# Patient Record
Sex: FEMALE | Race: WHITE | URBAN - METROPOLITAN AREA
[De-identification: names, ages, dates, MRNs, and addresses within clinical notes are randomized per-mention and may not be internally consistent; named-entity substitution may affect disease eponyms.]

---

## 2018-08-05 ENCOUNTER — OFFICE VISIT (OUTPATIENT)
Dept: URGENT CARE | Facility: URGENT CARE | Age: 28
End: 2018-08-05
Payer: COMMERCIAL

## 2018-08-05 ENCOUNTER — RADIANT APPOINTMENT (OUTPATIENT)
Dept: GENERAL RADIOLOGY | Facility: CLINIC | Age: 28
End: 2018-08-05
Attending: FAMILY MEDICINE
Payer: COMMERCIAL

## 2018-08-05 VITALS
HEART RATE: 77 BPM | DIASTOLIC BLOOD PRESSURE: 85 MMHG | TEMPERATURE: 97.5 F | WEIGHT: 192.31 LBS | OXYGEN SATURATION: 98 % | SYSTOLIC BLOOD PRESSURE: 129 MMHG

## 2018-08-05 DIAGNOSIS — R11.0 NAUSEA: ICD-10-CM

## 2018-08-05 DIAGNOSIS — Z72.0 TOBACCO ABUSE: ICD-10-CM

## 2018-08-05 DIAGNOSIS — R04.2 BLOOD-TINGED SPUTUM: ICD-10-CM

## 2018-08-05 DIAGNOSIS — R05.8 PRODUCTIVE COUGH: Primary | ICD-10-CM

## 2018-08-05 DIAGNOSIS — R07.0 THROAT PAIN: ICD-10-CM

## 2018-08-05 PROCEDURE — 71046 X-RAY EXAM CHEST 2 VIEWS: CPT | Mod: FY

## 2018-08-05 PROCEDURE — 99214 OFFICE O/P EST MOD 30 MIN: CPT | Performed by: FAMILY MEDICINE

## 2018-08-05 RX ORDER — ESTRADIOL 2 MG/1
TABLET ORAL
COMMUNITY
Start: 2018-07-03

## 2018-08-05 RX ORDER — AZITHROMYCIN 250 MG/1
TABLET, FILM COATED ORAL
Qty: 6 TABLET | Refills: 0 | Status: SHIPPED | OUTPATIENT
Start: 2018-08-05 | End: 2018-08-10

## 2018-08-05 RX ORDER — SPIRONOLACTONE 25 MG/1
50 TABLET ORAL
COMMUNITY
Start: 2018-07-03

## 2018-08-05 NOTE — PROGRESS NOTES
SUBJECTIVE: Maria De Jesus Garcia is a 28 year old female presenting with a chief complaint of nasal congestion, cough  and sore throat.  Onset of symptoms was 10 day(s) ago.  Course of illness is worsening.    Severity moderate  Current and Associated symptoms: runny nose, stuffy nose, cough - productive and facial pain/pressure  Treatment measures tried include None tried.  Predisposing factors include tobacco use.    No past medical history on file.  Allergies   Allergen Reactions     Cats      Vegetable Extract      Social History   Substance Use Topics     Smoking status: Former Smoker     Types: Cigarettes     Smokeless tobacco: Never Used     Alcohol use Not on file       ROS:  SKIN: no rash  GI: no vomiting    OBJECTIVE:  /85  Pulse 77  Temp 97.5  F (36.4  C) (Oral)  Wt 192 lb 5 oz (87.2 kg)  SpO2 98%GENERAL APPEARANCE: healthy, alert and no distress  EYES: EOMI,  PERRL, conjunctiva clear  HENT: ear canals and TM's normal.  Nose and mouth without ulcers, erythema or lesions  NECK: supple, nontender, no lymphadenopathy  RESP: lungs clear to auscultation - no rales, rhonchi or wheezes  CV: regular rates and rhythm, normal S1 S2, no murmur noted  ABDOMEN:  soft, nontender, no HSM or masses and bowel sounds normal  SKIN: no suspicious lesions or rashes      ICD-10-CM    1. Productive cough R05 XR Chest 2 Views     azithromycin (ZITHROMAX) 250 MG tablet   2. Blood-tinged sputum R04.2 XR Chest 2 Views     azithromycin (ZITHROMAX) 250 MG tablet   3. Throat pain R07.0 CANCELED: Strep, Rapid Screen   4. Nausea R11.0 XR Chest 2 Views   5. Tobacco abuse Z72.0 XR Chest 2 Views       Fluids/Rest, f/u if worse/not any better

## 2018-08-05 NOTE — MR AVS SNAPSHOT
After Visit Summary   8/5/2018    Maria De Jesus Garcia    MRN: [id number]           Patient Information     Date Of Birth          1990        Visit Information        Provider Department      8/5/2018 3:30 PM Cory Carrion,  [facility]        Today's Diagnoses     Productive cough    -  1    Blood-tinged sputum        Throat pain        Nausea        Tobacco abuse           Follow-ups after your visit        Who to contact     If you have questions or need follow up information about today's clinic visit or your schedule please contact [facility] directly at [phone].  Normal or non-critical lab and imaging results will be communicated to you by MyChart, letter or phone within 4 business days after the clinic has received the results. If you do not hear from us within 7 days, please contact the clinic through MyChart or phone. If you have a critical or abnormal lab result, we will notify you by phone as soon as possible.  Submit refill requests through Fonmatch or call your pharmacy and they will forward the refill request to us. Please allow 3 business days for your refill to be completed.          Additional Information About Your Visit        Care EveryWhere ID     This is your Care EveryWhere ID. This could be used by other organizations to access your [place] medical records  [id number]        Your Vitals Were     Pulse Temperature Pulse Oximetry             77 97.5  F (36.4  C) (Oral) 98%          Blood Pressure from Last 3 Encounters:   08/05/18 129/85    Weight from Last 3 Encounters:   08/05/18 192 lb 5 oz (87.2 kg)              We Performed the Following     XR Chest 2 Views          Today's Medication Changes          These changes are accurate as of 8/5/18  4:39 PM.  If you have any questions, ask your nurse or doctor.               Start taking these medicines.        Dose/Directions    azithromycin 250 MG tablet   Commonly  known as:  ZITHROMAX   Used for:  Productive cough, Blood-tinged sputum   Started by:  Cory Carrion, DO        Two tablets first day, then one tablet daily for four days.   Quantity:  6 tablet   Refills:  0            Where to get your medicines      These medications were sent to [facility] [address].  [address], [facility] [address]     Phone:  [phone]     azithromycin 250 MG tablet                Primary Care Provider Fax #    Physician No Ref-Primary [phone]       No address on file        Equal Access to Services     [facility]: Hadii martin leo hadasho Soomaali, waaxda luqadaha, qaybta kaalmada adeegyada, bhargavi marin . So [facility] [phone].    ATENCIÓN: Si habla español, tiene a enrique disposición servicios gratuitos de asistencia lingüística. Jodee al [phone].    We comply with applicable federal civil rights laws and [place] laws. We do not discriminate on the basis of race, color, national origin, age, disability, sex, sexual orientation, or gender identity.            Thank you!     Thank you for choosing [facility]  for your care. Our goal is always to provide you with excellent care. Hearing back from our patients is one way we can continue to improve our services. Please take a few minutes to complete the written survey that you may receive in the mail after your visit with us. Thank you!             Your Updated Medication List - Protect others around you: Learn how to safely use, store and throw away your medicines at www.disposemymeds.org.          This list is accurate as of 8/5/18  4:39 PM.  Always use your most recent med list.                   Brand Name Dispense Instructions for use Diagnosis    azithromycin 250 MG tablet    ZITHROMAX    6 tablet    Two tablets first day, then one tablet daily for four days.    Productive cough, Blood-tinged sputum       DAYQUIL OR       Take by mouth as needed        estradiol 2 MG tablet    ESTRACE     TAKE TWO TABLETS BY MOUTH EVERY MORNING AND TAKE ONE TABLET BY MOUTH EVERY EVENING        HEADACHE RELIEF PO      Take by mouth as needed        spironolactone 25 MG tablet    ALDACTONE     Take 50 mg by mouth

## 2019-01-25 ENCOUNTER — OFFICE VISIT (OUTPATIENT)
Dept: URGENT CARE | Facility: URGENT CARE | Age: 29
End: 2019-01-25
Payer: COMMERCIAL

## 2019-01-25 VITALS
WEIGHT: 209.2 LBS | SYSTOLIC BLOOD PRESSURE: 110 MMHG | RESPIRATION RATE: 12 BRPM | TEMPERATURE: 97.8 F | HEART RATE: 86 BPM | OXYGEN SATURATION: 97 % | DIASTOLIC BLOOD PRESSURE: 80 MMHG

## 2019-01-25 DIAGNOSIS — Z20.818 EXPOSURE TO STREP THROAT: ICD-10-CM

## 2019-01-25 DIAGNOSIS — R52 BODY ACHES: ICD-10-CM

## 2019-01-25 DIAGNOSIS — J02.9 SORETHROAT: Primary | ICD-10-CM

## 2019-01-25 LAB
DEPRECATED S PYO AG THROAT QL EIA: NORMAL
SPECIMEN SOURCE: NORMAL

## 2019-01-25 PROCEDURE — 87880 STREP A ASSAY W/OPTIC: CPT | Performed by: FAMILY MEDICINE

## 2019-01-25 PROCEDURE — 99213 OFFICE O/P EST LOW 20 MIN: CPT | Performed by: FAMILY MEDICINE

## 2019-01-25 PROCEDURE — 87081 CULTURE SCREEN ONLY: CPT | Performed by: FAMILY MEDICINE

## 2019-01-25 NOTE — LETTER
[facility]  [address] [address]  Phone: [phone]    01/25/19    Maria De Jesus Garcia  [address] [place] [id number]      To whom it may concern:     Maria De Jesus Garcia was seen in the clinic for current illness, she missed work today for her illness.      Sincerely,      Surekha Sal MD

## 2019-01-25 NOTE — PROGRESS NOTES
Chief Complaint   Patient presents with     Throat Pain     Pt states throat pain, body aches, chills      Urgent Care         SUBJECTIVE:   Maria De Jesus Garcia is a 28 year old female presenting with a chief complaint of cough - productive, sore throat and body aches. She is a new patient of [facility].  Onset of symptoms was 3 day(s) ago.  Course of illness is worsening.    Severity moderate  Current and Associated symptoms: cough - productive and sore throat  Treatment measures tried include Tylenol/Ibuprofen.  Predisposing factors include exposure to strep.    History reviewed. No pertinent past medical history.  Current Outpatient Medications   Medication Sig Dispense Refill     Aspirin-Acetaminophen-Caffeine (HEADACHE RELIEF PO) Take by mouth as needed       DM-Phenylephrine-Acetaminophen (COLD/FLU RELIEF PO)        estradiol (ESTRACE) 2 MG tablet TAKE TWO TABLETS BY MOUTH EVERY MORNING AND TAKE ONE TABLET BY MOUTH EVERY EVENING       spironolactone (ALDACTONE) 25 MG tablet Take 50 mg by mouth       Pseudoephedrine-APAP-DM (DAYQUIL OR) Take by mouth as needed       Social History     Tobacco Use     Smoking status: Former Smoker     Types: Cigarettes     Smokeless tobacco: Never Used   Substance Use Topics     Alcohol use: Not on file     Family History   Problem Relation Age of Onset     Cancer Maternal Grandfather          ROS:    10 point ROS of systems including Constitutional, Eyes Cardiovascular, Gastroenterology, Genitourinary, Integumentary, Muscularskeletal, Psychiatric were all negative except for pertinent positives noted in my HPI     [id number]}    OBJECTIVE:  /80   Pulse 86   Temp 97.8  F (36.6  C) (Oral)   Resp 12   Wt 94.9 kg (209 lb 3.2 oz)   SpO2 97%   GENERAL APPEARANCE: healthy, alert and no distress  EYES: EOMI,  PERRL, conjunctiva clear  HENT: ear canals and TM's normal.  Nose and mouth without ulcers, erythema or lesions  NECK: supple, nontender, no lymphadenopathy  RESP: lungs clear to  auscultation - no rales, rhonchi or wheezes  CV: regular rates and rhythm, normal S1 S2, no murmur noted  ABDOMEN:  soft, nontender, no HSM or masses and bowel sounds normal  SKIN: no suspicious lesions or rashes  Physical Exam      X-Ray was not done.      ASSESSMENT:  Maria De Jesus was seen today for throat pain and urgent care.    Diagnoses and all orders for this visit:    Sorethroat  -     Strep, Rapid Screen  -     Beta strep group A culture    Body aches    Exposure to strep throat          PLAN:  Tylenol, Ibuprofen, Fluids, Rest, Saline gargles, Saline nasal spray and Vaporizer  Notified patient about the negative strep result  Advised to continue doing symptomatic management  If notices any worsening symptoms should follow-up  Also was given a note for her work excusing her from work today as she missed her work due to her illness.  Pt understood and agreed with plan    Surekha Sal MD       See orders in Epic

## 2019-01-26 LAB
BACTERIA SPEC CULT: NORMAL
SPECIMEN SOURCE: NORMAL

## 2019-02-08 ENCOUNTER — ANCILLARY PROCEDURE (OUTPATIENT)
Dept: GENERAL RADIOLOGY | Facility: CLINIC | Age: 29
End: 2019-02-08
Attending: FAMILY MEDICINE
Payer: COMMERCIAL

## 2019-02-08 ENCOUNTER — OFFICE VISIT (OUTPATIENT)
Dept: URGENT CARE | Facility: URGENT CARE | Age: 29
End: 2019-02-08
Payer: COMMERCIAL

## 2019-02-08 VITALS
TEMPERATURE: 98.2 F | RESPIRATION RATE: 16 BRPM | SYSTOLIC BLOOD PRESSURE: 118 MMHG | OXYGEN SATURATION: 98 % | HEART RATE: 109 BPM | DIASTOLIC BLOOD PRESSURE: 78 MMHG

## 2019-02-08 DIAGNOSIS — R04.2 BLOOD-STREAKED SPUTUM: Primary | ICD-10-CM

## 2019-02-08 DIAGNOSIS — J20.9 ACUTE BRONCHITIS WITH COEXISTING CONDITION REQUIRING PROPHYLACTIC TREATMENT: ICD-10-CM

## 2019-02-08 PROCEDURE — 99214 OFFICE O/P EST MOD 30 MIN: CPT | Performed by: FAMILY MEDICINE

## 2019-02-08 PROCEDURE — 71046 X-RAY EXAM CHEST 2 VIEWS: CPT

## 2019-02-08 RX ORDER — CODEINE PHOSPHATE AND GUAIFENESIN 10; 100 MG/5ML; MG/5ML
1-2 SOLUTION ORAL EVERY 4 HOURS PRN
Qty: 118 ML | Refills: 0 | Status: SHIPPED | OUTPATIENT
Start: 2019-02-08 | End: 2019-08-07

## 2019-02-08 RX ORDER — AZITHROMYCIN 250 MG/1
TABLET, FILM COATED ORAL
Qty: 6 TABLET | Refills: 0 | Status: SHIPPED | OUTPATIENT
Start: 2019-02-08 | End: 2019-02-13

## 2019-02-08 NOTE — LETTER
February 8, 2019      Maria De Jesus Garcia  [address] [place] [id number]        To Whom It May Concern:    Maria De Jesus Garcia  was seen on 2/8/2019.  Please excuse her due to illness.        Sincerely,        Surekha Sal MD

## 2019-02-08 NOTE — PROGRESS NOTES
SUBJECTIVE:   Maria De Jesus Garcia is a 28 year old female presenting with a chief complaint of congestion with productive coughing for almost last 1-1/2 weeks but then for a day she has noticed some blood-streaked sputum denies any fresh blood but did notice some brown colored streaks in her phlegm.  Patient denies any shortness of breath fever chills or chest pain symptoms.  She has been trying over-the-counter medication with not much improvement in his symptoms stuffy nose and cough - productive. She is an established patient of [facility].  Onset of symptoms was 13 day(s) ago.  Course of illness is worsening.    Severity moderate  Current and Associated symptoms: stuffy nose, cough - productive and headache  Treatment measures tried include OTC Cough med.  Predisposing factors include None.    History reviewed. No pertinent past medical history.  Current Outpatient Medications   Medication Sig Dispense Refill     estradiol (ESTRACE) 2 MG tablet TAKE TWO TABLETS BY MOUTH EVERY MORNING AND TAKE ONE TABLET BY MOUTH EVERY EVENING       spironolactone (ALDACTONE) 25 MG tablet Take 50 mg by mouth       Aspirin-Acetaminophen-Caffeine (HEADACHE RELIEF PO) Take by mouth as needed       DM-Phenylephrine-Acetaminophen (COLD/FLU RELIEF PO)        Pseudoephedrine-APAP-DM (DAYQUIL OR) Take by mouth as needed       Social History     Tobacco Use     Smoking status: Former Smoker     Types: Cigarettes     Smokeless tobacco: Never Used   Substance Use Topics     Alcohol use: Not on file     Family History   Problem Relation Age of Onset     Cancer Maternal Grandfather          ROS:    10 point ROS of systems including Constitutional, Eyes,Cardiovascular, Gastroenterology, Genitourinary, Integumentary, Muscularskeletal, Psychiatric were all negative except for pertinent positives noted in my HPI         OBJECTIVE:  /78   Pulse 109   Temp 98.2  F (36.8  C) (Oral)   Resp 16   SpO2 98%   GENERAL APPEARANCE: healthy, alert and no  distress  EYES: EOMI,  PERRL, conjunctiva clear  HENT: ear canals and TM's normal.  Nose and mouth without ulcers, erythema or lesions  NECK: supple, nontender, no lymphadenopathy  RESP: lungs clear to auscultation - no rales, rhonchi or wheezes  CV: regular rates and rhythm, normal S1 S2, no murmur noted  ABDOMEN:  soft, nontender, no HSM or masses and bowel sounds normal  SKIN: no suspicious lesions or rashes  Physical Exam      X-Ray was done, my findings are: WNL    Medical Decision Making:    Differential Diagnosis:  URI Adult/Peds:  Bronchiolitis, Pneumonia and Sinusitis      ASSESSMENT:  Maria De Jesus was seen today for urgent care.    Diagnoses and all orders for this visit:    Blood-streaked sputum  -     XR Chest 2 Views    Acute bronchitis with coexisting condition requiring prophylactic treatment  -     XR Chest 2 Views  -     azithromycin (ZITHROMAX) 250 MG tablet; Take 2 tablets (500 mg) by mouth daily for 1 day, THEN 1 tablet (250 mg) daily for 4 days.  -     guaiFENesin-codeine (ROBITUSSIN AC) 100-10 MG/5ML solution; Take 5-10 mLs by mouth every 4 hours as needed for cough          PLAN:  Tylenol, Ibuprofen, Fluids, Rest, Saline gargles, Saline nasal spray and Vaporizer  Advised patient to start the antibiotic take it as directed  Reviewed the x-ray results there was no acute infiltrate noted  Discussed with patient not to drive when taking the cough medication if notices any worsening chest pain  Shortness of breath fever chills should follow-up   With the worsening of the symptoms patient is possibly developing a pneumonia and would treat her with an antibiotic though x-ray does not show any findings today.  Patient was also given a note for her work.too      Surekha Sal MD     See orders in Epic

## 2020-03-20 ENCOUNTER — NURSE TRIAGE (OUTPATIENT)
Dept: NURSING | Facility: CLINIC | Age: 30
End: 2020-03-20

## 2020-03-20 DIAGNOSIS — Z79.2 PREVENTIVE ANTIBIOTIC: Primary | ICD-10-CM

## 2020-03-20 RX ORDER — AZITHROMYCIN 250 MG/1
TABLET, FILM COATED ORAL
Qty: 6 TABLET | Refills: 0 | Status: SHIPPED | OUTPATIENT
Start: 2020-03-20 | End: 2020-05-16

## 2020-03-20 NOTE — TELEPHONE ENCOUNTER
S: Calling  about blood tinged sputum.  B: 2/9/19 had a similar symptoms. Was placed on antibiotics.   Today has a productive cough started around 3/13. On 3/19 started having blood tinged sputum.  Has had the chills.  Does not believe she has a fever, no thermometer at home. Is SOB if climbs a flight of steps or walks 1 block.  A: Requesting to see a provider at [facility].  Does not have a PCP.  R: Writer  will be routing a message to the covid19 pool for a provider to call the patient back.  Mouna Hunter RN, [facility]    Additional Information    Negative: Bluish (or gray) lips or face    Negative: Severe difficulty breathing (e.g., struggling for each breath, speaks in single words)    Negative: Rapid onset of cough and has hives    Negative: Coughing started suddenly after medicine, an allergic food or bee sting    Negative: Difficulty breathing after exposure to flames, smoke, or fumes    Negative: Sounds like a life-threatening emergency to the triager    Negative: Previous asthma attacks and this feels like asthma attack    Negative: Chest pain present when not coughing    Negative: Difficulty breathing    Negative: Passed out (i.e., fainted, collapsed and was not responding)    Negative: Patient sounds very sick or weak to the triager    Coughed up > 1 tablespoon (15 ml) blood (Exception: blood-tinged sputum)    Protocols used: COUGH-A-OH

## 2020-03-20 NOTE — PROGRESS NOTES
I contacted the patient and educated them on the current guidelines for mild-moderate Covid-19 symptoms. Patient was advised to self quarantine for 7 days past the onset of symptoms (or 3 days after not having a fever). Patient will proceed to the ED if symptoms worsen and become critical.     Due to the patient's history of bronchitis, I would like to treat her with a z-jessenia. If z-jessenia does not help to improve symptoms, please proceed to the ED for further evaluation if symptoms continue to worsen.     Matt Shah PA-C on 3/20/2020 at 6:39 PM

## 2020-03-20 NOTE — LETTER
March 20, 2020      To Whom It May Concern:      Maria De Jesus Garcia was seen via a telephone encounter in our urgent care today, 03/20/20.  I expect her condition to improve over the next 7 days.  She may return to work when improved.    Sincerely,        Matt Shah PA-C

## 2020-04-01 ENCOUNTER — NURSE TRIAGE (OUTPATIENT)
Dept: NURSING | Facility: CLINIC | Age: 30
End: 2020-04-01

## 2020-04-01 ENCOUNTER — VIRTUAL VISIT (OUTPATIENT)
Dept: URGENT CARE | Facility: CLINIC | Age: 30
End: 2020-04-01
Payer: COMMERCIAL

## 2020-04-01 DIAGNOSIS — J20.9 ACUTE BRONCHITIS, UNSPECIFIED ORGANISM: ICD-10-CM

## 2020-04-01 DIAGNOSIS — Z20.822 SUSPECTED 2019 NOVEL CORONAVIRUS INFECTION: Primary | ICD-10-CM

## 2020-04-01 PROCEDURE — 99207 ZZC NO BILLABLE SERVICE THIS VISIT: CPT | Mod: TEL | Performed by: STUDENT IN AN ORGANIZED HEALTH CARE EDUCATION/TRAINING PROGRAM

## 2020-04-01 NOTE — LETTER
April 1, 2020      Maria De Jesus Garcia  [address]   [facility] [address]        To Whom It May Concern:    Maria De Jesus Garcia was seen in our clinic. She may return to work with the following on 4/8 if she meets all of the following criteria:    1. Absence of fever for 72 hours AND  2. Significant improvement in symptoms of cough and shortness of breath AND  3. Greater than 7 days since start of her symptoms.     Sincerely,        Zane Connors MD

## 2020-04-01 NOTE — PROGRESS NOTES
"The patient has been notified of following:     \"This telephone visit will be conducted via a call between you and your physician/provider. We have found that certain health care needs can be provided without the need for a physical exam.  This service lets us provide the care you need with a short phone conversation.  If a prescription is necessary we can send it directly to your pharmacy.  If lab work is needed we can place an order for that and you can then stop by our lab to have the test done at a later time.    If during the course of the call the physician/provider feels a telephone visit is not appropriate, you will not be charged for this service.\"     Subjective     CC: Maria De Jesus Garcia  is a 29 year old female who presents to clinic today for the following health issues:   Chief Complaint   Patient presents with     Suspected Covid      History:    Patient with subjective fever/chills and cough productive of blood tinged sputum, chest pain, light headedness on 3/19 with associated shortness of breath.  She was prescribed z-pack given her symptoms and history of bronchitis. Z-pac helped significantly but never completely went away but cough and shortness of breath improved over the weekend.      Symptoms worsened two days ago, now having blood tinged sputum with associated chest pain, shortness of breath, and light headedness with exertion. Having some mild wheezing.  Describes chest pain as a \"sharp rumbling/vibrating pain\" worse when she breathes hard or walks. This morning felt feverish, took dayquil and ibuprofen.  Does not have a thermometer.  Has mild nasal congestion, intermittent headaches, and sore throat. Denies sinus pain/pressure. Denies loss of sensation of taste/smell.  Has low appetite, but has been eating and drinking regularly, able to maintain hydration.      Patient reports history of lung infections that usually improved with a z-pac.  Reports history of chronic bronchitis.  Last cigarette " "was 6 months ago, prior to this smoked 1 cigarette every day-week.     Lives with stefanie and three roommates. Stefanie has cold like symptoms.  Denies any known close contact with COVID-19 patient.  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     No recent hospitalizations. No chance of pregnancy or breast feeding.     There is no problem list on file for this patient.    No past surgical history on file.    Social History     Tobacco Use     Smoking status: Former Smoker     Types: Cigarettes     Smokeless tobacco: Never Used   Substance Use Topics     Alcohol use: Not on file     Family History   Problem Relation Age of Onset     Cancer Maternal Grandfather          Current Outpatient Medications   Medication Sig Dispense Refill     Aspirin-Acetaminophen-Caffeine (HEADACHE RELIEF PO) Take by mouth as needed       DM-Phenylephrine-Acetaminophen (COLD/FLU RELIEF PO)        estradiol (ESTRACE) 2 MG tablet TAKE TWO TABLETS BY MOUTH EVERY MORNING AND TAKE ONE TABLET BY MOUTH EVERY EVENING       Pseudoephedrine-APAP-DM (DAYQUIL OR) Take by mouth as needed       spironolactone (ALDACTONE) 25 MG tablet Take 50 mg by mouth       Allergies   Allergen Reactions     Cats      Vegetable Extract      Review of Systems   ROS COMP: Constitutional, HEENT, cardiovascular, pulmonary, GI, , musculoskeletal, neuro, skin, endocrine and psych systems are negative, except as otherwise noted.      Objective    Gen: Patient is alert, oriented  Resp: is not speaking full sentences given frequent coughing fits, with cough,  without audible shortness of breath, without wheezing    Assessment/Plan:  1. Suspected 2019 Novel Coronavirus Infection  2. Acute bronchitis, unspecified organism  Patient reports 2 weeks of subjective fevers, cough with blood tinged sputum, shortness of " breath, pleuritic chest pain that improved with z-pac, now worsening, suspect bronchitis however unclear etiology, consider viral URI (such as coronavirus), viral pneumonia, bacterial pneumonia as well.  Given duration and trajectory of symptoms patient would benefit from in person urgent care for CXR to better delineate symptoms. Patient directed and given instructions on how to make appointment to urgent care, and instructions for self-management and isolation given symptoms could be due to coronavirus.   - get well loop referral placed     Phone call duration:  20 minutes  Start time: 9:10 am  End time: 9:30 am      Plan of care discussed with Dr. Ramos.    Maximus Connors MD PhD   Internal Medicine, PGY-3

## 2020-04-01 NOTE — PATIENT INSTRUCTIONS
Instructions for Patients  Based on the information you have provided, further evaluation in urgent care is indicated. [facility] call [facility] ([phone]) to schedule a urgent care appointment at one of our urgent care or walk in care sites. These are located in [facility], [place]), [place] and [place]. It is essential that you let the  know that you were referred to be seen in urgent care from [facility] or provider visit.         At this time we will NOT perform coronavirus testing in urgent care sites. This test is currently being reserved for patients who are being admitted to the hospital.    PLEASE BRING DOCUMENTATION FROM THIS COMPLETED VISIT TO YOUR URGENT CARE VISIT.    Please also bring your smart device(s) (smart phones, tablets, laptops) and their charging cables for your personal use and to communicate with your care team during your visit.        [facility] Information  We know it can be scary to hear that you might have COVID-19. Our team can help track your symptoms and make sure you are doing ok over the next two weeks using a program called [email or web address] to keep in touch. When you receive an email from [email or web address], please consider enrolling in our monitoring program. There is no cost to you for monitoring. Here is a URL where you can learn more: [email or web address]      Additional General Information About COVID-19    COVID-19 - General Information:  Regardless of if you have been tested or not:  Patient who have symptoms (cough, fever, or shortness of breath), need to isolate for 7 days from when symptoms started AND 72 hours after fever resolves (without fever reducing medications) AND improvement of respiratory symptoms (whichever is longer).      Isolate yourself at home (in own room/own bathroom if possible)    Do Not allow any visitors    Do Not go to work or school    Do Not go to  Confucianism,  centers, shopping, or other public places.    Do Not shake hands.    Avoid close and intimate contact with others (hugging, kissing).    Follow CDC recommendations for household cleaning of frequently touched services.     After the initial 7 days, continue to isolate yourself from household members as much as possible. To continue decrease the risk of community spread and exposure, you and any members of your household should limit activities in public for 14 days after starting home isolation.     You can reference the following CDC link for helpful home isolation/care tips:  https://www.cdc.gov/coronavirus/2019-ncov/downloads/10Things.pdf    COVID-19 - Symptoms:     The COVID-19 can cause a respiratory illness, such as bronchitis or pneumonia.    The most common symptoms are: cough, fever, and shortness of breath.     Other symptoms are: body aches, chills, diarrhea, fatigue, headache, runny nose, and sore throat     COVID-19 - Exposure Risk Factors:    Exposure to a person who has been diagnosed with COVID-19 .    Travel from an area with recent local transmission of COVID-19 .    The CDC (www.cdc.gov) has the most up-to-date list of where the COVID-19 outbreak is occurring.    COVID-19 - Spreading:     The virus likely spreads through respiratory droplets produced when a person coughs or sneezes. These respiratory droplets can travel approximately 6 feet and can remain on surfaces.  Common disinfectants will kill the virus.    The CDC currently does not recommend healthy people wearing masks.    COVID-19 - Protect Yourself:     Avoid close contact with people known to have this new COVID-19 infection.    Wash hands often with soap and water or alcohol-based hand .    Avoid touching the eyes, nose or mouth.       Thank you for limiting contact with others, wearing a simple mask to cover your cough, practice good hand hygiene habits and accessing our [email or web address] services where possible to  limit the spread of this virus.    For more information about COVID19 and options for caring for yourself at home, please visit the CDC website at https://www.cdc.gov/coronavirus/2019-ncov/about/steps-when-sick.html  For more options for care at [facility], please visit our website at [email or web address]

## 2020-04-01 NOTE — TELEPHONE ENCOUNTER
Maria De Jesus called a while ago and was prescribed a zpak and is helping.  maria de jesus has a history of lung infections.  Cough is present and wet which started 3 to 4 weeks ago.  Fever is present.  Shortness of breath is also present.  Started taking Zpak on 3/20/2020.  Today is wheezing.  Maria De Jesus is requesting a telephone visit.      Reason for Disposition    Wheezing is present    Additional Information    Negative: Severe difficulty breathing (e.g., struggling for each breath, speaks in single words)    Negative: Bluish (or gray) lips or face now    Negative: [1] Difficulty breathing AND [2] exposure to flames, smoke, or fumes    Negative: [1] Stridor AND [2] difficulty breathing    Negative: Sounds like a life-threatening emergency to the triager    Negative: Chest pain  (Exception: MILD central chest pain, present only when coughing)    Negative: Difficulty breathing    Negative: Patient sounds very sick or weak to the triager    Negative: [1] Coughed up blood AND [2] > 1 tablespoon (15 ml) (Exception: blood-tinged sputum)    Negative: Fever > 103 F (39.4 C)    Negative: [1] Fever > 101 F (38.3 C) AND [2] age > 60    Negative: [1] Fever > 100.0 F (37.8 C) AND [2] bedridden (e.g., nursing home patient, CVA, chronic illness, recovering from surgery)    Negative: [1] Fever > 100.0 F (37.8 C) AND [2] diabetes mellitus or weak immune system (e.g., HIV positive, cancer chemo, splenectomy, chronic steroids)    Protocols used: COUGH - ACUTE TDHIYABNAT-P-NK

## 2020-04-02 ENCOUNTER — OFFICE VISIT (OUTPATIENT)
Dept: URGENT CARE | Facility: URGENT CARE | Age: 30
End: 2020-04-02
Payer: COMMERCIAL

## 2020-04-02 VITALS
DIASTOLIC BLOOD PRESSURE: 85 MMHG | SYSTOLIC BLOOD PRESSURE: 110 MMHG | OXYGEN SATURATION: 98 % | TEMPERATURE: 98.2 F | RESPIRATION RATE: 12 BRPM | HEART RATE: 95 BPM

## 2020-04-02 DIAGNOSIS — R05.9 COUGH: Primary | ICD-10-CM

## 2020-04-02 PROCEDURE — 99214 OFFICE O/P EST MOD 30 MIN: CPT | Performed by: FAMILY MEDICINE

## 2020-04-02 RX ORDER — LEVOFLOXACIN 500 MG/1
500 TABLET, FILM COATED ORAL DAILY
Qty: 7 TABLET | Refills: 0 | Status: SHIPPED | OUTPATIENT
Start: 2020-04-02 | End: 2020-04-12

## 2020-04-02 RX ORDER — CODEINE PHOSPHATE/GUAIFENESIN 10-100MG/5
5 LIQUID (ML) ORAL EVERY 4 HOURS PRN
Qty: 240 ML | Refills: 0 | Status: SHIPPED | OUTPATIENT
Start: 2020-04-02

## 2020-04-02 NOTE — PROGRESS NOTES
I reviewed the telephone visit encounter and discussed the patient with the resident and agree with the resident s assessment and plan of care as documented.      Ethan Ramos MD  04/01/20 7:11 PM

## 2020-04-03 NOTE — PROGRESS NOTES
SUBJECTIVE:  Maria De Jesus Garcia is a 29 year old female who presents to the clinic today with a chief complaint of cough  for 4 week(s).  Her cough is described as persistent and productive blood tinged.    The patient's symptoms are moderate and stable.  Associated symptoms include fever. The patient's symptoms are exacerbated by no particular triggers  Patient has been using OTC cough suppressants  to improve symptoms and was on an antibiotic (finished zithromax one week ago)   Helped some but has come back    Hx of lung infections    Current Outpatient Medications   Medication Sig Dispense Refill     Aspirin-Acetaminophen-Caffeine (HEADACHE RELIEF PO) Take by mouth as needed       estradiol (ESTRACE) 2 MG tablet TAKE TWO TABLETS BY MOUTH EVERY MORNING AND TAKE ONE TABLET BY MOUTH EVERY EVENING       spironolactone (ALDACTONE) 25 MG tablet Take 50 mg by mouth       DM-Phenylephrine-Acetaminophen (COLD/FLU RELIEF PO)        Pseudoephedrine-APAP-DM (DAYQUIL OR) Take by mouth as needed         Social History     Tobacco Use     Smoking status: Former Smoker     Types: Cigarettes     Smokeless tobacco: Never Used   Substance Use Topics     Alcohol use: Not on file       ROS  INTEGUMENTARY/SKIN: NEGATIVE for worrisome rashes, moles or lesions  EYES: NEGATIVE for vision changes or irritation    OBJECTIVE:  /85 (BP Location: Left arm, Patient Position: Sitting, Cuff Size: Adult Regular)   Pulse 95   Temp 98.2  F (36.8  C)   Resp 12   SpO2 98%   GENERAL APPEARANCE: mild distress and over weight  EYES: EOMI,  PERRL, conjunctiva clear  HENT: ear canals and TM's normal.  Nose and mouth without ulcers, erythema or lesions  NECK: supple, nontender, no lymphadenopathy  RESP: lungs clear to auscultation - no rales, rhonchi or wheezes  CV: regular rates and rhythm, normal S1 S2, no murmur noted  NEURO: Normal strength and tone, sensory exam grossly normal,  normal speech and mentation  SKIN: no suspicious lesions or  rashes    ASSESSMENT:  1. Cough  Broaden coverage    sats normal and exam as well minus her appearance    Symptomatic cares were discussed in detail.  Pt instructed to come back to the clinic for worsening sx especially given current coronavirus issue at hand  - levofloxacin (LEVAQUIN) 500 MG tablet; Take 1 tablet (500 mg) by mouth daily  Dispense: 7 tablet; Refill: 0  - guaiFENesin-codeine (GUAIFENESIN AC) 100-10 MG/5ML syrup; Take 5 mLs by mouth every 4 hours as needed for congestion  Dispense: 240 mL; Refill: 0

## 2020-04-08 ENCOUNTER — TELEPHONE (OUTPATIENT)
Dept: URGENT CARE | Facility: URGENT CARE | Age: 30
End: 2020-04-08

## 2020-04-12 ENCOUNTER — HOSPITAL ENCOUNTER (EMERGENCY)
Facility: CLINIC | Age: 30
Discharge: HOME | End: 2020-04-12
Attending: EMERGENCY MEDICINE | Admitting: EMERGENCY MEDICINE
Payer: COMMERCIAL

## 2020-04-12 ENCOUNTER — NURSE TRIAGE (OUTPATIENT)
Dept: NURSING | Facility: CLINIC | Age: 30
End: 2020-04-12

## 2020-04-12 ENCOUNTER — APPOINTMENT (OUTPATIENT)
Dept: GENERAL RADIOLOGY | Facility: CLINIC | Age: 30
End: 2020-04-12
Attending: EMERGENCY MEDICINE
Payer: COMMERCIAL

## 2020-04-12 VITALS
DIASTOLIC BLOOD PRESSURE: 88 MMHG | SYSTOLIC BLOOD PRESSURE: 109 MMHG | HEART RATE: 86 BPM | OXYGEN SATURATION: 97 % | TEMPERATURE: 98.1 F | RESPIRATION RATE: 20 BRPM

## 2020-04-12 DIAGNOSIS — B34.9 ACUTE VIRAL SYNDROME: ICD-10-CM

## 2020-04-12 DIAGNOSIS — J45.21 MILD INTERMITTENT ASTHMA WITH EXACERBATION: ICD-10-CM

## 2020-04-12 PROBLEM — F43.10 PTSD (POST-TRAUMATIC STRESS DISORDER): Status: ACTIVE | Noted: 2020-04-12

## 2020-04-12 LAB
ALBUMIN SERPL-MCNC: 3.3 G/DL (ref 3.4–5)
ALP SERPL-CCNC: 89 U/L (ref 40–150)
ALT SERPL W P-5'-P-CCNC: 21 U/L (ref 0–50)
ANION GAP SERPL CALCULATED.3IONS-SCNC: 4 MMOL/L (ref 3–14)
AST SERPL W P-5'-P-CCNC: 18 U/L (ref 0–45)
BASOPHILS # BLD AUTO: 0.1 10E9/L (ref 0–0.2)
BASOPHILS NFR BLD AUTO: 0.5 %
BILIRUB DIRECT SERPL-MCNC: <0.1 MG/DL (ref 0–0.2)
BILIRUB SERPL-MCNC: 0.2 MG/DL (ref 0.2–1.3)
BUN SERPL-MCNC: 10 MG/DL (ref 7–30)
CALCIUM SERPL-MCNC: 8.5 MG/DL (ref 8.5–10.1)
CHLORIDE SERPL-SCNC: 107 MMOL/L (ref 94–109)
CO2 SERPL-SCNC: 26 MMOL/L (ref 20–32)
CREAT SERPL-MCNC: 0.92 MG/DL (ref 0.52–1.04)
DIFFERENTIAL METHOD BLD: ABNORMAL
EOSINOPHIL # BLD AUTO: 0.7 10E9/L (ref 0–0.7)
EOSINOPHIL NFR BLD AUTO: 5.9 %
ERYTHROCYTE [DISTWIDTH] IN BLOOD BY AUTOMATED COUNT: 11.8 % (ref 10–15)
GFR SERPL CREATININE-BSD FRML MDRD: 83 ML/MIN/{1.73_M2}
GLUCOSE SERPL-MCNC: 91 MG/DL (ref 70–99)
HCT VFR BLD AUTO: 38.4 % (ref 35–47)
HGB BLD-MCNC: 13.3 G/DL (ref 11.7–15.7)
IMM GRANULOCYTES # BLD: 0.1 10E9/L (ref 0–0.4)
IMM GRANULOCYTES NFR BLD: 0.7 %
LYMPHOCYTES # BLD AUTO: 2.7 10E9/L (ref 0.8–5.3)
LYMPHOCYTES NFR BLD AUTO: 22.3 %
MCH RBC QN AUTO: 29.9 PG (ref 26.5–33)
MCHC RBC AUTO-ENTMCNC: 34.6 G/DL (ref 31.5–36.5)
MCV RBC AUTO: 86 FL (ref 78–100)
MONOCYTES # BLD AUTO: 0.6 10E9/L (ref 0–1.3)
MONOCYTES NFR BLD AUTO: 5.3 %
NEUTROPHILS # BLD AUTO: 7.9 10E9/L (ref 1.6–8.3)
NEUTROPHILS NFR BLD AUTO: 65.3 %
NRBC # BLD AUTO: 0 10*3/UL
NRBC BLD AUTO-RTO: 0 /100
PLATELET # BLD AUTO: 276 10E9/L (ref 150–450)
POTASSIUM SERPL-SCNC: 3.7 MMOL/L (ref 3.4–5.3)
PROT SERPL-MCNC: 7.2 G/DL (ref 6.8–8.8)
RBC # BLD AUTO: 4.45 10E12/L (ref 3.8–5.2)
SODIUM SERPL-SCNC: 137 MMOL/L (ref 133–144)
TROPONIN I SERPL-MCNC: <0.015 UG/L (ref 0–0.04)
WBC # BLD AUTO: 12.1 10E9/L (ref 4–11)

## 2020-04-12 PROCEDURE — 93005 ELECTROCARDIOGRAM TRACING: CPT

## 2020-04-12 PROCEDURE — 71045 X-RAY EXAM CHEST 1 VIEW: CPT

## 2020-04-12 PROCEDURE — 25000131 ZZH RX MED GY IP 250 OP 636 PS 637: Performed by: EMERGENCY MEDICINE

## 2020-04-12 PROCEDURE — 84484 ASSAY OF TROPONIN QUANT: CPT | Performed by: EMERGENCY MEDICINE

## 2020-04-12 PROCEDURE — 80076 HEPATIC FUNCTION PANEL: CPT | Performed by: EMERGENCY MEDICINE

## 2020-04-12 PROCEDURE — 25000132 ZZH RX MED GY IP 250 OP 250 PS 637: Performed by: EMERGENCY MEDICINE

## 2020-04-12 PROCEDURE — 80048 BASIC METABOLIC PNL TOTAL CA: CPT | Performed by: EMERGENCY MEDICINE

## 2020-04-12 PROCEDURE — 94640 AIRWAY INHALATION TREATMENT: CPT

## 2020-04-12 PROCEDURE — 99285 EMERGENCY DEPT VISIT HI MDM: CPT | Mod: 25

## 2020-04-12 PROCEDURE — 85025 COMPLETE CBC W/AUTO DIFF WBC: CPT | Performed by: EMERGENCY MEDICINE

## 2020-04-12 RX ORDER — ALBUTEROL SULFATE 90 UG/1
2-4 AEROSOL, METERED RESPIRATORY (INHALATION) EVERY 4 HOURS PRN
Qty: 1 INHALER | Refills: 0
Start: 2020-04-12 | End: 2020-05-16

## 2020-04-12 RX ORDER — PREDNISONE 20 MG/1
40 TABLET ORAL DAILY
Qty: 8 TABLET | Refills: 0 | Status: SHIPPED | OUTPATIENT
Start: 2020-04-12 | End: 2020-04-16

## 2020-04-12 RX ORDER — ALBUTEROL SULFATE 90 UG/1
6 AEROSOL, METERED RESPIRATORY (INHALATION) ONCE
Status: COMPLETED | OUTPATIENT
Start: 2020-04-12 | End: 2020-04-12

## 2020-04-12 RX ORDER — PREDNISONE 20 MG/1
40 TABLET ORAL ONCE
Status: COMPLETED | OUTPATIENT
Start: 2020-04-12 | End: 2020-04-12

## 2020-04-12 RX ADMIN — PREDNISONE 40 MG: 20 TABLET ORAL at 16:22

## 2020-04-12 RX ADMIN — ALBUTEROL SULFATE 6 PUFF: 90 AEROSOL, METERED RESPIRATORY (INHALATION) at 16:22

## 2020-04-12 ASSESSMENT — ENCOUNTER SYMPTOMS
SORE THROAT: 1
SEIZURES: 0
ABDOMINAL PAIN: 0
DIZZINESS: 1
PALPITATIONS: 0
COUGH: 1
FATIGUE: 1
FLANK PAIN: 0
ABDOMINAL DISTENTION: 0
SHORTNESS OF BREATH: 1
FEVER: 1
ARTHRALGIAS: 0
MYALGIAS: 0

## 2020-04-12 NOTE — LETTER
April 12, 2020      To Whom It May Concern:      Maria De Jesus Garcia was seen in our Emergency Department today, 04/12/20. She must self quarantine at home until both of the following conditions are met.    At least 3 days (72 hours) have passed since the last fever without the use of fever-reducing medications (tylenol/acetaminophen or advil/motrin/ibuprofen/aleve/naproxen) and she has improvement in respiratory symptoms (e.g., cough, shortness of breath).    AND    At least 7 days have passed since symptoms first appeared.    In addition she should avoid doing things that worsen her symptoms such as prolonged talking.     Sincerely,        Jm Wang MD

## 2020-04-12 NOTE — ED AVS SNAPSHOT
[facility] Emergency Department  [address]  [facility] [address]  Phone:  [phone]  Fax:  [phone]                                    Maria De Jesus Garcia   MRN: [id number]    Department:  [facility] Emergency Department   Date of Visit:  4/12/2020           After Visit Summary Signature Page    I have received my discharge instructions, and my questions have been answered. I have discussed any challenges I see with this plan with the nurse or doctor.    ..........................................................................................................................................  Patient/Patient Representative Signature      ..........................................................................................................................................  Patient Representative Print Name and Relationship to Patient    ..................................................               ................................................  Date                                   Time    ..........................................................................................................................................  Reviewed by Signature/Title    ...................................................              ..............................................  Date                                               Time          22EPIC Rev 08/18

## 2020-04-12 NOTE — DISCHARGE INSTRUCTIONS
Instructions for Patients  Your symptoms could be due to COVID-19, but it also could be due to a number of other respiratory illnesses.  We are not doing testing at this time for COVID-19 unless symptoms are severe enough to require hospitalization.  It is recommended that you stay home to prevent the spread of your illness.  To do this follow these instructions:    Regardless of if you have been tested or not:  Patient who have symptoms (cough, fever, or shortness of breath), need to isolate for 7 days from when symptoms started AND 72 hours after fever resolves (without fever reducing medications) AND improvement of respiratory symptoms (whichever is longer).    Isolate yourself at home (in own room/own bathroom if possible)  Do Not allow any visitors  Do Not go to work or school  Do Not go to [employer]ism,  centers, shopping, or other public places.  Do Not shake hands.  Avoid close and intimate contact with others (hugging, kissing).  Follow CDC recommendations for household cleaning of frequently touched services.     After the initial 7 days, continue to isolate yourself from household members as much as possible. To continue decrease the risk of community spread and exposure, you and any members of your household should limit activities in public for 14 days after starting home isolation.     You can reference the following CDC link for helpful home isolation/care tips:  https://www.cdc.gov/coronavirus/2019-ncov/downloads/10Things.pdf    Protect Others:  Cover your mouth and nose with a mask, disposable tissue or wash cloth to avoid spreading germs to others.  Wash your hands and face frequently with soap and water.    Fever Medicines:  For fever relief, take acetaminophen or ibuprofen.  Treat fevers above 101  F (38.3  C) to lower fevers and make you more comfortable.   Acetaminophen (e.g., Tylenol): Take 650 mg (two 325 mg pills) by mouth every 4-6 hours as needed of regular strength Tylenol or 1,000 mg  (two 500 mg pills) every 8 hours as needed of Extra Strength Tylenol.   Ibuprofen (e.g., Motrin, Advil): Take 400 mg (two 200 mg pills) by mouth every 6 hours as needed.   Acetaminophen is thought to be safer than ibuprofen or naproxen for people over 65 years old. Acetaminophen is in many OTC and prescription medicines. It might be in more than one medicine that you are taking. You need to be careful and not take an overdose. Before taking any medicine, read all the instructions on the package.  Caution - NSAIDs (e.g., ibuprofen, naproxen): Do not take nonsteroidal anti-inflammatory drugs (NSAIDs) if you have stomach problems, kidney disease, heart failure, or other contraindications to using this type of medicine. Do not take NSAID medicines for over 7 days without consulting your PCP. Do not take NSAID medicines if you are pregnant. Do not take NSAID medicines if you are also taking blood thinners.     Call Back If: Breathing difficulty develops or you become worse.    Thank you for limiting contact with others, wearing a simple mask to cover your cough, practice good hand hygiene habits and accessing our virtual services where possible to limit the spread of this virus.    For more information about COVID19 and options for caring for yourself at home, please visit the CDC website at https://www.cdc.gov/coronavirus/2019-ncov/about/steps-when-sick.html  For more options for care at [facility], please visit our website at [email or web address]     Discharge Instructions  Asthma    Asthma is a condition causing narrowing and inflammation of the airways that can make it hard to breathe.  Asthma can also cause cough, wheezing, noisy breathing and tightness in the chest.  Asthma can be brought on or  triggered  by many things, including dust, mold, pollen, cigarette smoke, exercise, stress and infections (like the common cold).     Generally, every Emergency Department visit should  have a follow-up clinic visit with either a primary or a specialty clinic/provider. Please follow-up as instructed by your emergency provider today.    Return to the Emergency Department if:  Your breathing gets worse.  You need to use your inhaler more often than every 4 hours, or cannot get relief from your inhaler.  You are very weak, or feel much more ill.  You develop new symptoms, such as chest pain.  You cough up blood.  You are vomiting (throwing up) enough that you cannot keep fluids or your medicine down.    What can I do to help myself?  Fill any prescriptions the provider gave you and take them right away--especially antibiotics. Be sure to finish the whole antibiotic prescription.  You may be given a prescription for an inhaler, which can help loosen tight air passages.  Use this as needed, but not more often than directed. Inhalers work much better when used with a spacer.   You may be given a prescription for a steroid to reduce inflammation. Used long-term, these can have some side effects, but for short-term use they are safe. You may notice restlessness or increased appetite (eating more).      You may use non-prescription cough or cold medicines. Cough medicines may help, but do not make the cough go away completely.   Avoid smoke, because this can make you feel worse. If you smoke, this may be a good time to quit! Consider using nicotine lozenges, gum, or patches to reduce cravings.   If you have a fever, Tylenol  (acetaminophen), Motrin  (ibuprofen), or Advil  (ibuprofen), may help bring fever down and may help you feel more comfortable. Be sure to read and follow the package directions, and ask your provider if you have questions.  Be sure to get your flu shot each year.  For certain age groups, the pneumonia shot can help prevent pneumonia.  It is important that you follow up with your regular provider, to be sure that you are improving from this spell (an acute asthma exacerbation), and also  to do what you can to keep from having trouble again. Sometimes you need long-term medicines to keep your asthma under control.   If you were given a prescription for medicine here today, be sure to read all of the information (including the package insert) that comes with your prescription.  This will include important information about the medicine, its side effects, and any warnings that you need to know about.  The pharmacist who fills the prescription can provide more information and answer questions you may have about the medicine.  If you have questions or concerns that the pharmacist cannot address, please call or return to the Emergency Department.   Remember that you can always come back to the Emergency Department if you are not able to see your regular provider in the amount of time listed above, if you get any new symptoms, or if there is anything that worries you.

## 2020-04-12 NOTE — ED TRIAGE NOTES
Pt A&Ox4. ABCs intact. Pt reports frequent cough with decreased bloody sputum. Reports lung infection where she took z-pack. Pt reports difficulty with talking and breathing due to cough.

## 2020-04-12 NOTE — TELEPHONE ENCOUNTER
Pt and  calling about her worsening cough over the past month. Now it's very frequently, non productive, Can't take without coughing. After being up for 5 minutes will get light headed. She is drinking fluids and no fever now. Has constant chest pain, worse with coughing. Has had 2 rounds of antibiotics which did improve her cough, but much worse now. No lab tests nor imaging have been done. I did say they could try [email or web address], but they declined that. No  provider virtual visits available for today.     Maida Monsivais RN  [facility]  Triage Nurse Advisors      Reason for Disposition    Chest pain  (Exception: MILD central chest pain, present only when coughing)    Additional Information    Negative: Severe difficulty breathing (e.g., struggling for each breath, speaks in single words)    Negative: Bluish (or gray) lips or face now    Negative: [1] Rapid onset of cough AND [2] has hives    Negative: Coughing started suddenly after medicine, an allergic food or bee sting    Negative: [1] Difficulty breathing AND [2] exposure to flames, smoke, or fumes    Negative: [1] Stridor AND [2] difficulty breathing    Negative: Sounds like a life-threatening emergency to the triager    Negative: Choked on object of food that could be caught in the throat    Negative: Chest pain is main symptom    Negative: [1] Previous asthma attacks AND [2] this feels like asthma attack    Negative: Cough lasts > 3 weeks    Negative: Wet (productive) cough (i.e., white-yellow, yellow, green, or maite colored sputum)    Protocols used: COUGH - ACUTE NON-PRODUCTIVE-A-AH

## 2020-04-12 NOTE — ED PROVIDER NOTES
History     Chief Complaint:  Shortness of Breath and Cough       HPI   Maria De Jesus Garcia is a 29 year old female with a remote past history of asthma now taking no medications for this who presents with ongoing cough and shortness of breath for greater than 1 month.  Patient reports that she initially came down with viral symptoms with cough mild sore throat low-grade fevers and intermittent chest pain approximately 1 month ago.  She initially had a virtual visit as well as an urgent care visit and was prescribed a Z-Grzegorz which she completed.  Following this her symptoms had not improved significantly and so she saw urgent care who prescribed levofloxacin 500 mg twice daily x7 days 10 days ago.  She reports that she is completely finished this but her cough has been ongoing.  She notes that she has intermittent chest pain when she coughs and has persistent shortness of breath although she actually feels that the cough and shortness of breath is improving given that her symptoms are persistent her significant other was concerned and called the nurse line who recommended she be seen in the emergency department.  She has not had any recent fevers in the last 5 days.  Her significant other is not ill and she has no other known sick contacts or recent travel history.  No rash, abdominal pain.  She reports that earlier in the illness she had some nausea vomiting and diarrhea although this has resolved.  She denies any other symptoms at this time.    Allergies:  Cats  Vegetable Extract     Medications:    estradiol   spironolactone    Past Medical History:    PTSD  Galactorrhea    Past Surgical History:    [place] teeth extraction   Appendectomy     Family History:    Family history reviewed. No pertinent family history.       Social History:  Smoking Status: former  Smokeless Tobacco: Never Used    Review of Systems   Constitutional: Positive for fatigue and fever.   HENT: Positive for sore throat.    Respiratory: Positive  for cough and shortness of breath.    Cardiovascular: Positive for chest pain. Negative for palpitations and leg swelling.   Gastrointestinal: Negative for abdominal distention and abdominal pain.   Genitourinary: Negative for flank pain.   Musculoskeletal: Negative for arthralgias and myalgias.   Neurological: Positive for dizziness. Negative for seizures and syncope.   All other systems reviewed and are negative.        Physical Exam     Patient Vitals for the past 24 hrs:   BP Temp Temp src Pulse Resp SpO2   04/12/20 1544 -- 98.1  F (36.7  C) Oral -- 20 --   04/12/20 1530 109/88 -- -- 86 -- 97 %        Physical Exam  General: Well appearing, nontoxic. Frequent dry cough.  Head:  Scalp, face, and head appear normal  Eyes:  Pupils are equal, round, and reactive to light    Conjunctivae non-injected and sclerae white  ENT:    The external nose is normal    Pinnae are normal    The oropharynx is normal, mucous membranes moist    Posterior pharynx clear without swelling, exudates or erythema    Uvula is in the midline  Neck:  Normal range of motion    There is no rigidity noted    Trachea is in the midline  CV:  Regular rate and rhythm     Normal S1/S2, no S3/S4    No murmur or rub. Radial pulses 2+ bilaterally   Resp:  Lungs are equal bilaterally    There is no tachypnea    No increased work of breathing    Expiratory wheezing louder in the right lung fields in the left.  No rales or rhonchi.  GI:  Abdomen is soft, obese, no rigidity or guarding    No distension, or mass    No tenderness or rebound tenderness   MS:  Normal muscular tone    Symmetric motor strength    No lower extremity edema  Skin:  No rash or acute skin lesions noted  Neuro: Awake and alert    Speech is normal and fluent    Moves all extremities spontaneously  Psych:  Normal affect.  Appropriate interactions.      Emergency Department Course   ECG:  ECG taken at 1553  Sinus rhythm  Minimal voltage criteria for LVH, may be normal variant  Septal  infarct , age undetermined  Abnormal ECG  Vent. rate 85 BPM  OR interval 142 ms  QRS duration 88 ms  QT/QTc 380/452 ms  P-R-T axes 19 -1 19    Imaging:  Radiology findings were communicated with the patient who voiced understanding of the findings.    XR Chest Port 1 View  Final Result  IMPRESSION: No acute disease.  CHRISTOFER BUENO MD  Reading per radiology     Laboratory:  Laboratory findings were communicated with the patient who voiced understanding of the findings.    BMP: AWNL (Creatinine 0.92)     Troponin(1533): <0.015      Hepatic panel: albumin 3.3 (L), o/w wnl     Interventions:  1622 Albuterol inhaler 6 puff inhalation   1622 Prednisone 40 mg oral     Emergency Department Course:  Past medical records, nursing notes, and vitals reviewed.    1523 I performed an exam of the patient as documented above.    IV was inserted and blood was drawn for laboratory testing, results above.     The patient was sent for imaging while in the emergency department, results above.       1650 Patient rechecked and updated.       Findings and plan explained to the Patient. Patient discharged home with instructions regarding supportive care, medications, and reasons to return. The importance of close follow-up was reviewed. The patient was prescribed albuterol inhaler and prednisone.       Impression & Plan     Medical Decision Making:  Maria De Jesus Garcia is a 29 year old female who presents to the emergency department today with signs and symptoms of recent likely viral illness with persistent cough and chest pain.  She is already taken a full course of azithromycin and levofloxacin but has persistent symptoms.  Of note she has a history of asthma which has been well controlled and she takes no regular medications for.  A broad ddx was considered including viral and bacterial causes of infection including URI, pharyngitis, bronchitis, pneumonia, influenza, COVID-19, OM, Strep pharyngitis, sinus infection, among others. Clinically  the patient is well appearing without increased work of breathing, respiratory distress, hypoxia, signs of ARDS or other serious decompensation or complication. Clinical signs and symptoms are not consistent with meningitis or sepsis. The patient does not have high risk exposure for COVID-19. Due to rationing of testing supplies she does not meet criteria for COVID-19/influenza testing at this time based on CDC/[place] guidelines. CXR was obtained and neg for acute findings.  She does have expiratory wheezing on examination.  She had significant improvement in her symptoms with albuterol in the ED.  Given her history of asthma will treat with albuterol and prednisone burst x5 days.  I recommended supportive care for treatment of likely underlying viral syndrome including possible COVID-19. Good oral fluid intake. Discussed the importance of home quarantine and CDC guidelines on self-quarantine were provided as part of discharge instructions. No indication for hospitalization at this time. Return precautions were discussed with patient. The patient's questions were answered and the patient was agreeable with discharge.      Discharge Diagnosis:    ICD-10-CM    1. Mild intermittent asthma with exacerbation  J45.21    2. Acute viral syndrome  B34.9        Disposition:  Discharged to home.    Discharge Medications:  New Prescriptions    ALBUTEROL (PROAIR HFA/PROVENTIL HFA/VENTOLIN HFA) 108 (90 BASE) MCG/ACT INHALER    Inhale 2-4 puffs into the lungs every 4 hours as needed for shortness of breath / dyspnea or other (cough) Use with spacer device.    PREDNISONE (DELTASONE) 20 MG TABLET    Take 2 tablets (40 mg) by mouth daily for 4 days       Scribe Disclosure:  Ke MOSELEY, am serving as a scribe at 3:23 PM on 4/12/2020 to document services personally performed by Jm Wang MD based on my observations and the provider's statements to me.      4/12/2020   Jm Wang MD Daro, Ryan Clay, MD  04/12/20  2011

## 2020-04-12 NOTE — LETTER
April 12, 2020      To Whom It May Concern:      Maria De Jesus Garcia was seen in our Emergency Department today, 04/12/20. She must self-quarantine at home until both of the following conditions are met:    At least 3 days (72 hours) have passed since the last fever without the use of fever-reducing medications (tylenol/acetaminophen or advil/motrin/ibuprofen/aleve/naproxen) and she has improvement in respiratory symptoms (e.g., cough, shortness of breath).    AND    At least 7 days have passed since symptoms first appeared.        Sincerely,        Jm Wang MD

## 2020-04-13 LAB — INTERPRETATION ECG - MUSE: NORMAL

## 2020-04-16 ENCOUNTER — NURSE TRIAGE (OUTPATIENT)
Dept: NURSING | Facility: CLINIC | Age: 30
End: 2020-04-16

## 2020-04-17 NOTE — TELEPHONE ENCOUNTER
Maria De Jesus wondering if she can / should return to work ?  Seen in  recently, and in ED most recently (4/12/20).  Did get diagnosed with asthma after a lung bacterial and viral infection.  Completed course of Prednisone, still having persistent cough.  Cleared to return to work with return to work letter from ED provider, as the viral infection had reportedly cleared.  Advised to continue with plan of care, if able to work, no changes would be warranted.  Advised if symptoms are severe enough to prevent her from working, she would need to be seen again.  Advised ED provider will not provide another letter without an evaluation first.  Verbalizes understanding.  AVS reviewed, is to be seen around 4/19/20 as f/u to ED visit.            Reason for Disposition    [1] Follow-up call to recent contact AND [2] information only call, no triage required    Protocols used: INFORMATION ONLY CALL-A-

## 2020-05-15 ENCOUNTER — NURSE TRIAGE (OUTPATIENT)
Dept: NURSING | Facility: CLINIC | Age: 30
End: 2020-05-15

## 2020-05-15 NOTE — TELEPHONE ENCOUNTER
Call received from Emanuel Orlando who reports to be patients nataliya.  Verbal consent from patient to discuss medical information with Emanuel.    Maria De Jesus has a persistent cough whenever she tries speaking more than a few words, when laughing, deep breathing.    She was seen in the ER on 4/12 with similar symptoms. She has had some improvement since then. At that time she was coughing up blood.    She reports intermittent episodes of chest pain, independent of coughing.    She has been coughing up mucus in the morning, usually clear, and a small amount throughout the day.    As recently as 2 weeks ago she would cough up mucus with brown flecks    Currently pulse ~52    She has been treating her symptoms with Theraflu, acetaminophen, chloraseptic lozenges and chloraseptic spray.    Per protocol, advised to be seen/evaluated by a provider within 24 hours.  Care Advice reviewed.    Patient does not currently have a PCP.  Warm transferred to scheduling for virtual urgent care visit.    COVID 19 Nurse Triage Plan/Patient Instructions    Please be aware that novel coronavirus (COVID-19) may be circulating in the community. If you develop symptoms such as fever, cough, or SOB or if you have concerns about the presence of another infection including coronavirus (COVID-19), please contact your health care provider or visit [email or web address].     Disposition/Instructions    Patient to have an Urgent Care Telephone Visit with a provider. Follow System Ambulatory Workflow for COVID 19.     Urgent Care Telephone Visits are available between the hours of 8 am to 9 pm. Staff will assist patent in scheduling an appointment for this Urgent Care Telephone Visit.     Call Back If: Your symptoms worsen before you are able to complete your Urgent Care Telephone Visit with a provider.    Thank you for limiting contact with others, wearing a simple mask to cover your cough, practice good hand hygiene habits and accessing our virtual services where  possible to limit the spread of this virus.    For more information about COVID19 and options for caring for yourself at home, please visit the CDC website at https://www.cdc.gov/coronavirus/2019-ncov/about/steps-when-sick.html  For more options for care at [facility], please visit our website at [email or web address]    For more information, please use the [place] Department of Health COVID-19 Website: [email or web address]  [place] Department of Health ([facility]) COVID-19 Hotlines (Interpreters available):      Health questions: Phone Number: [phone] or [phone] and Hours: 7 a.m. to 7 p.m.    Schools and  questions: Phone Number: [phone] or [phone] and Hours 7 a.m. to 7 p.m.    Edwina Hernandez RN  [facility]      Additional Information    Negative: SEVERE difficulty breathing (e.g., struggling for each breath, speaks in single words)    Negative: Difficult to awaken or acting confused (e.g., disoriented, slurred speech)    Negative: Bluish (or gray) lips or face now    Negative: Shock suspected (e.g., cold/pale/clammy skin, too weak to stand, low BP, rapid pulse)    Negative: Sounds like a life-threatening emergency to the triager    Negative: SEVERE or constant chest pain (Exception: mild central chest pain, present only when coughing)    Negative: MODERATE difficulty breathing (e.g., speaks in phrases, SOB even at rest, pulse 100-120)    Negative: Severe difficulty breathing (e.g., struggling for each breath, speaks in single words)    Negative: Bluish (or gray) lips or face now    Negative: [1] Difficulty breathing AND [2] exposure to flames, smoke, or fumes    Negative: [1] Stridor AND [2] difficulty breathing    Negative: Sounds like a life-threatening emergency to the triager    Negative: Chest pain  (Exception: MILD central chest pain, present only when coughing)    Negative: Difficulty  breathing    Negative: [1] Coughed up blood AND [2] > 1 tablespoon (15 ml) (Exception: blood-tinged sputum)    Negative: Fever > 103 F (39.4 C)    Negative: [1] Fever > 101 F (38.3 C) AND [2] age > 60    Negative: [1] Fever > 100.0 F (37.8 C) AND [2] bedridden (e.g., nursing home patient, CVA, chronic illness, recovering from surgery)    Negative: [1] Fever > 100.0 F (37.8 C) AND [2] diabetes mellitus or weak immune system (e.g., HIV positive, cancer chemo, splenectomy, organ transplant, chronic steroids)    Negative: Wheezing is present    SEVERE coughing spells (e.g., whooping sound after coughing, vomiting after coughing)    Protocols used: CORONAVIRUS (COVID-19) DIAGNOSED OR KKZIAINXD-D-QC 4.22.20, COUGH - ACUTE ONKNGHCOXX-W-SV

## 2020-05-16 ENCOUNTER — VIRTUAL VISIT (OUTPATIENT)
Dept: URGENT CARE | Facility: CLINIC | Age: 30
End: 2020-05-16
Payer: COMMERCIAL

## 2020-05-16 DIAGNOSIS — J45.901 EXACERBATION OF ASTHMA, UNSPECIFIED ASTHMA SEVERITY, UNSPECIFIED WHETHER PERSISTENT: ICD-10-CM

## 2020-05-16 DIAGNOSIS — R05.9 COUGH: Primary | ICD-10-CM

## 2020-05-16 DIAGNOSIS — R06.2 WHEEZING: ICD-10-CM

## 2020-05-16 PROCEDURE — 99214 OFFICE O/P EST MOD 30 MIN: CPT | Mod: TEL | Performed by: PHYSICIAN ASSISTANT

## 2020-05-16 RX ORDER — MONTELUKAST SODIUM 10 MG/1
10 TABLET ORAL AT BEDTIME
Qty: 90 TABLET | Refills: 3 | Status: SHIPPED | OUTPATIENT
Start: 2020-05-16 | End: 2021-01-28

## 2020-05-16 RX ORDER — FLUTICASONE PROPIONATE AND SALMETEROL XINAFOATE 115; 21 UG/1; UG/1
1 AEROSOL, METERED RESPIRATORY (INHALATION) 2 TIMES DAILY
Qty: 1 INHALER | Refills: 3 | Status: SHIPPED | OUTPATIENT
Start: 2020-05-16 | End: 2021-01-28

## 2020-05-16 RX ORDER — ALBUTEROL SULFATE 90 UG/1
2-4 AEROSOL, METERED RESPIRATORY (INHALATION) EVERY 4 HOURS PRN
Qty: 1 INHALER | Refills: 4 | Status: SHIPPED | OUTPATIENT
Start: 2020-05-16 | End: 2021-01-28

## 2020-05-16 NOTE — PATIENT INSTRUCTIONS
"  Patient Education     Asthma (Adult)  Asthma is a disease where the medium and  small air passages within the lung go into spasm and restrict the flow of air. Inflammation and swelling of the airways cause further blockage. During an acute asthma attack, these factors cause trouble breathing, wheezing, cough and chest tightness.    An asthma attack can be triggered by many things. Common triggers include infections such as the common cold, bronchitis, and pneumonia. Irritants such as smoke or pollutants in the air, very cold air, emotional upset, and exercise can also trigger an attack. In many adults with asthma, allergies to dust, mold, pollen and animal dander can cause an asthma attack. Skipping doses of daily asthma medicine can also bring on an asthma attack.  Asthma can be controlled using the proper medicines prescribed by your healthcare provider and avoiding exposure to known triggers including allergens and irritants.  Home care    Take prescribed medicine exactly at the times advised. If you need medicine such as from a hand held inhaler or aerosol breathing machine more than every 4 hours, contact your healthcare provider or seek immediate medical attention. If prescribed an antibiotic or prednisone, take all of the medicine as prescribed, even if you are feeling better after a few days.    Don't smoke. Avoid being exposed to the smoke of others.    Some people with asthma have worsening of their symptoms when they take aspirin and non-steroidal or fever-reducing medicines like ibuprofen and naproxen. Talk to your healthcare provider if you think this may apply to you.  Follow-up care  Follow up with your healthcare provider, or as advised. Always bring all of your current medicines to any appointments with your healthcare provider. Also bring a complete list of medicines even those not taken for asthma. If you don't already have one, talk to your healthcare provider about developing your own \"Asthma " "Action Plan.\"  A pneumococcal (pneumonia) vaccine and yearly flu shot (every fall) are recommended. Ask your doctor about this.  When to seek medical advice  Call your healthcare provider right away if any of these occur:     Increased wheezing or shortness of breath    Need to use your inhalers more often than usual without relief    Fever of 100.4 F (38 C) or higher, or as directed by your healthcare provider    Coughing up lots of dark-colored or bloody sputum (mucus)    Chest pain with each breath    If you use a peak flow meter as part of an Asthma Action Plan, and you are still in the yellow zone (50% to 80%) 15 minutes after using inhaler medicine.  Call 911  Call 911 if any of the following occur    Trouble walking or talking because of shortness of breath    If you use a peak flow meter as part of an Asthma Action Plan and you are still in the red zone (less than 50%) 15 minutes after using inhaler medicine    Lips or fingernails turning gray or blue  Date Last Reviewed: 5/1/2017 2000-2019 [employer]. [address] [place] [address], [place] [address]. All rights reserved. This information is not intended as a substitute for professional medical care. Always follow your healthcare professional's instructions.           "

## 2020-05-16 NOTE — LETTER
[facility] VIRTUAL URGENT CARE  [address] [address]  Phone: [phone]    May 16, 2020        Maria De Jesus Garcia  [address]   [facility] [address]          To whom it may concern:    RE: Maria De Jesus Garcia    Patient was seen and treated today at our clinic.  She is continuing to have a persistent cough and throat pain that is brought on by straining her lungs and by talking. She is not having symptoms concerning for infection and is ok to work but should not speak or stress her lungs until she has resolution of pain and coughing fits associated with this.     Please contact me for questions or concerns.      Sincerely,        Lashon Mei PA-C

## 2020-05-16 NOTE — PROGRESS NOTES
"Maria De Jesus Garcia is a 30 year old female who is being evaluated via a billable telephone visit.      The patient has been notified of following:     \"This telephone visit will be conducted via a call between you and your physician/provider. We have found that certain health care needs can be provided without the need for a physical exam.  This service lets us provide the care you need with a short phone conversation.  If a prescription is necessary we can send it directly to your pharmacy.  If lab work is needed we can place an order for that and you can then stop by our lab to have the test done at a later time.    Telephone visits are billed at different rates depending on your insurance coverage. During this emergency period, for some insurers they may be billed the same as an in-person visit.  Please reach out to your insurance provider with any questions.    If during the course of the call the physician/provider feels a telephone visit is not appropriate, you will not be charged for this service.\"    Patient has given verbal consent for Telephone visit?  Yes    How would you like to obtain your AVS? Brendonhart    Due to inability to speak without having coughing fits patient gives verbal consent to speak through her fiance during this phone visit.     Subjective     Maria De Jesus Garcia is a 30 year old female who presents to clinic today for the following health issues:    HPI     Patient has had a persistent dry cough for over a month. She was initially evaluated by Urgent care and has been treated with a Z pack and a course of Levaquin. More recently April 12 she was seen in the ER and evaluated as she was starting to have intermittent chest pain associated with the cough. Her work up was essentially negative and she was discharged with an albuterol inhaler and a course of prednisone. She reports that those did help her symptoms but she ran out of the inhaler a few weeks ago and has continued to have a cough and throat " pain associated with talking and exerting her lungs. She denies any fevers or chills, denies any palpitations. She has had some mucus production in the morning but cough is generally dry. She has had persistent wheezing. She says she needs a new work note to allow her to limit use of her voice as that sets off her symptoms. She has not been on any allergy medications or other remedies for her symptoms. She has not had formal lung evaluation but was told by the ER doctor she could have chronic bronchitis. 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    BP Readings from Last 3 Encounters:   04/12/20 109/88   04/02/20 110/85   02/08/19 118/78    Wt Readings from Last 3 Encounters:   01/25/19 94.9 kg (209 lb 3.2 oz)   08/05/18 87.2 kg (192 lb 5 oz)                    Reviewed and updated as needed this visit by Provider  Tobacco  Allergies  Meds  Problems  Med Hx  Surg Hx  Fam Hx  Soc Hx          Review of Systems   Constitutional, HEENT, cardiovascular, pulmonary, GI, , musculoskeletal, neuro, skin, endocrine and psych systems are negative, except as otherwise noted.       Objective   Reported vitals:  There were no vitals taken for this visit.   healthy, alert and no distress  PSYCH: Alert and oriented times 3; coherent speech, normal   rate and volume, able to articulate logical thoughts, able   to abstract reason, no tangential thoughts, no hallucinations   or delusions  Her affect is normal  RESP: Patient has a persistent dry cough, no noted respiratory distress but speaking does seem to set off coughing fits.   Remainder of exam unable to be completed due to telephone visits    Diagnostic Test Results:  Labs reviewed in Epic        Assessment/Plan:  ASSESSMENT AND PLAN    ICD-10-CM    1. Cough  R05 albuterol (PROAIR HFA/PROVENTIL HFA/VENTOLIN HFA) 108 (90 Base) MCG/ACT inhaler     fluticasone-salmeterol (ADVAIR-HFA) 115-21  MCG/ACT inhaler     montelukast (SINGULAIR) 10 MG tablet     ALLERGY/ASTHMA ADULT REFERRAL   2. Wheezing  R06.2 albuterol (PROAIR HFA/PROVENTIL HFA/VENTOLIN HFA) 108 (90 Base) MCG/ACT inhaler     fluticasone-salmeterol (ADVAIR-HFA) 115-21 MCG/ACT inhaler     ALLERGY/ASTHMA ADULT REFERRAL   3. Exacerbation of asthma, unspecified asthma severity, unspecified whether persistent  J45.901 fluticasone-salmeterol (ADVAIR-HFA) 115-21 MCG/ACT inhaler     montelukast (SINGULAIR) 10 MG tablet     ALLERGY/ASTHMA ADULT REFERRAL         Return for Asthma/allergy evaluation.      Phone call duration:  15 minutes    Lashon Mei PA-C

## 2020-08-09 DIAGNOSIS — J45.901 EXACERBATION OF ASTHMA, UNSPECIFIED ASTHMA SEVERITY, UNSPECIFIED WHETHER PERSISTENT: ICD-10-CM

## 2020-08-09 DIAGNOSIS — R06.2 WHEEZING: ICD-10-CM

## 2020-08-09 DIAGNOSIS — R05.9 COUGH: ICD-10-CM

## 2020-08-12 ENCOUNTER — MYC MEDICAL ADVICE (OUTPATIENT)
Dept: FAMILY MEDICINE | Facility: CLINIC | Age: 30
End: 2020-08-12

## 2020-08-12 RX ORDER — FLUTICASONE PROPIONATE AND SALMETEROL XINAFOATE 115; 21 UG/1; UG/1
1 AEROSOL, METERED RESPIRATORY (INHALATION) 2 TIMES DAILY
Qty: 1 INHALER | Refills: 3 | OUTPATIENT
Start: 2020-08-12

## 2020-08-12 NOTE — TELEPHONE ENCOUNTER
Pt has not been seen at the [facility]. She has only been seen in UC at  clinic  Message sent to pt on mychart.   We cannot refill UC meds. She will need to make appt    Huyen Hamlin RN, BSN     [facility]

## 2020-08-13 NOTE — TELEPHONE ENCOUNTER
See pt response, she never requested advair from her pharmacy.Likely automatic refill request initiated by them    Huyen Hamlin RN, BSN

## 2021-01-03 ENCOUNTER — HEALTH MAINTENANCE LETTER (OUTPATIENT)
Age: 31
End: 2021-01-03

## 2021-01-28 ENCOUNTER — OFFICE VISIT (OUTPATIENT)
Dept: ALLERGY | Facility: CLINIC | Age: 31
End: 2021-01-28
Payer: COMMERCIAL

## 2021-01-28 VITALS
OXYGEN SATURATION: 100 % | SYSTOLIC BLOOD PRESSURE: 114 MMHG | BODY MASS INDEX: 32.8 KG/M2 | HEIGHT: 67 IN | HEART RATE: 83 BPM | WEIGHT: 209 LBS | DIASTOLIC BLOOD PRESSURE: 77 MMHG

## 2021-01-28 DIAGNOSIS — R06.2 WHEEZING: Primary | ICD-10-CM

## 2021-01-28 DIAGNOSIS — R05.9 COUGH: ICD-10-CM

## 2021-01-28 PROCEDURE — 99204 OFFICE O/P NEW MOD 45 MIN: CPT | Performed by: ALLERGY & IMMUNOLOGY

## 2021-01-28 RX ORDER — FLUTICASONE PROPIONATE AND SALMETEROL XINAFOATE 230; 21 UG/1; UG/1
2 AEROSOL, METERED RESPIRATORY (INHALATION) 2 TIMES DAILY
Qty: 12 G | Refills: 3 | Status: SHIPPED | OUTPATIENT
Start: 2021-01-28 | End: 2021-06-22

## 2021-01-28 RX ORDER — ALBUTEROL SULFATE 90 UG/1
2-4 AEROSOL, METERED RESPIRATORY (INHALATION) EVERY 4 HOURS PRN
Qty: 1 INHALER | Refills: 4 | Status: SHIPPED | OUTPATIENT
Start: 2021-01-28

## 2021-01-28 RX ORDER — PREDNISONE 10 MG/1
30 TABLET ORAL DAILY
Qty: 21 TABLET | Refills: 0 | Status: SHIPPED | OUTPATIENT
Start: 2021-01-28 | End: 2021-02-04

## 2021-01-28 ASSESSMENT — PAIN SCALES - GENERAL: PAINLEVEL: MODERATE PAIN (4)

## 2021-01-28 ASSESSMENT — ASTHMA QUESTIONNAIRES
QUESTION_5 LAST FOUR WEEKS HOW WOULD YOU RATE YOUR ASTHMA CONTROL: SOMEWHAT CONTROLLED
ACT_TOTALSCORE: 17
EMERGENCY_ROOM_LAST_YEAR_TOTAL: THREE OR MORE
QUESTION_1 LAST FOUR WEEKS HOW MUCH OF THE TIME DID YOUR ASTHMA KEEP YOU FROM GETTING AS MUCH DONE AT WORK, SCHOOL OR AT HOME: NONE OF THE TIME
QUESTION_4 LAST FOUR WEEKS HOW OFTEN HAVE YOU USED YOUR RESCUE INHALER OR NEBULIZER MEDICATION (SUCH AS ALBUTEROL): TWO OR THREE TIMES PER WEEK
QUESTION_3 LAST FOUR WEEKS HOW OFTEN DID YOUR ASTHMA SYMPTOMS (WHEEZING, COUGHING, SHORTNESS OF BREATH, CHEST TIGHTNESS OR PAIN) WAKE YOU UP AT NIGHT OR EARLIER THAN USUAL IN THE MORNING: ONCE A WEEK
QUESTION_2 LAST FOUR WEEKS HOW OFTEN HAVE YOU HAD SHORTNESS OF BREATH: THREE TO SIX TIMES A WEEK

## 2021-01-28 ASSESSMENT — MIFFLIN-ST. JEOR: SCORE: 1690.65

## 2021-01-28 NOTE — ASSESSMENT & PLAN NOTE
Wheezing, coughing and shortness of breath since March.  Began with presumed Covid.  Had been on Singulair not beneficial.  Had been on Advair and somewhat helpful.  Albuterol helpful.  Prednisone helpful.  Symptoms since that time flared with laughter, exercise, cold air and talking.    -Sending for complete pulmonary function studies.  -Prednisone 30 mg p.o. twice daily for 7 days.  -Start Advair 230/21 mcg 2 puff inhaled twice daily.  -Albuterol 2-4 puffs inhaled (use a spacer unless using a Proair Respiclick device) every 4 hours as needed for chest tightness, wheezing, shortness of breath and/or coughing.   -Return in 4 weeks.  -Return for allergy testing.

## 2021-01-28 NOTE — LETTER
1/28/2021         RE: Maria De Jesus Garcia  [address]  [facility] [address]        Dear Colleague,    Thank you for referring your patient, Maria De Jesus Garcia, to the [facility]. Please see a copy of my visit note below.    Maria De Jesus Garcia is a 30 year old White female with previous medical history significant for cough. Maria De Jesus Garcia is being seen today for evaluation of asthma.     Patient reports that beginning in March she developed a cough.  They thought she had Covid in March but never got tested.  Since that time she has struggled with cough.  Cough is associated with wheezing, shortness of breath.  No history of asthma.  Since this time symptoms flare with cold air, exercise, laughter and talking.  In the past URIs have caused coughing, wheezing and shortness of breath.  Treated with antibiotics and prednisone and thought beneficial.  Was started on Advair 115/21 mcg 2 puff inhaled twice daily.  Thought beneficial but did not resolve cough.  Albuterol is helpful for cough.  Using 1-3 times per week.  Cough drops have been not beneficial.  Symptoms day and night.  Symptoms currently having wheezing, cough and shortness of breath.  No history of pulmonary function studies.  Chest x-rays have been normal.  Numerous ER visits.  No history of allergy testing.  Denies allergy nasal or ocular symptoms.  They have 1 cat in the house.  Cat is been the house for 2 years.    ENVIRONMENTAL HISTORY: [place] The home is heated with a forced air. They do have central air conditioning. The patient's bedroom is furnished with carpeting in bedroom.  Pets inside the house include 1 cat(s). There is no history of cockroach or mice infestation. There is/are 0 smokers in the house.  The house do have a damp basement.       Past Medical History:   Diagnosis Date     Uncomplicated asthma      Family History   Problem Relation Age of Onset     Cancer Maternal  Grandfather      History reviewed. No pertinent surgical history.    REVIEW OF SYSTEMS:  General: negative for weight gain. negative for weight loss. negative for changes in sleep.   Ears: negative for fullness. negative for hearing loss. negative for dizziness.   Nose: negative for snoring.positive  for changes in smell. negative for drainage.   Eyes: negative for eye watering. negative for eye itching. negative for vision changes. negative for eye redness.  Throat: negative for hoarseness. negative for sore throat. negative for trouble swallowing.   Lungs: positive  for shortness of breath.positive  for wheezing. positive  for sputum production.   Cardiovascular: positive  for chest pain. negative for swelling of ankles. negative for fast or irregular heartbeat.   Gastrointestinal: negative for nausea. negative for heartburn. negative for acid reflux.   Musculoskeletal: negative for joint pain. negative for joint stiffness. negative for joint swelling.   Neurologic: negative for seizures. negative for fainting. negative for weakness.   Psychiatric: negative for changes in mood. negative for anxiety.   Endocrine: negative for cold intolerance. negative for heat intolerance. negative for tremors.   Lymphatic: negative for lower extremity swelling. negative for lymph node swelling.   Hematologic: negative for easy bruising. negative for easy bleeding.  Integumentary: negative for rash. negative for scaling. negative for nail changes.       Current Outpatient Medications:      albuterol (PROAIR HFA/PROVENTIL HFA/VENTOLIN HFA) 108 (90 Base) MCG/ACT inhaler, Inhale 2-4 puffs into the lungs every 4 hours as needed for shortness of breath / dyspnea, wheezing or other (cough) Use with spacer device., Disp: 1 Inhaler, Rfl: 4     Aspirin-Acetaminophen-Caffeine (HEADACHE RELIEF PO), Take by mouth as needed, Disp: , Rfl:      DM-Phenylephrine-Acetaminophen (COLD/FLU RELIEF PO), , Disp: , Rfl:      estradiol (ESTRACE) 2 MG  tablet, TAKE TWO TABLETS BY MOUTH EVERY MORNING AND TAKE ONE TABLET BY MOUTH EVERY EVENING, Disp: , Rfl:      fluticasone-salmeterol (ADVAIR HFA) 230-21 MCG/ACT inhaler, Inhale 2 puffs into the lungs 2 times daily, Disp: 12 g, Rfl: 3     predniSONE (DELTASONE) 10 MG tablet, Take 3 tablets (30 mg) by mouth daily for 7 days, Disp: 21 tablet, Rfl: 0     Pseudoephedrine-APAP-DM (DAYQUIL OR), Take by mouth as needed, Disp: , Rfl:      spironolactone (ALDACTONE) 25 MG tablet, Take 50 mg by mouth, Disp: , Rfl:      guaiFENesin-codeine (GUAIFENESIN AC) 100-10 MG/5ML syrup, Take 5 mLs by mouth every 4 hours as needed for congestion (Patient not taking: Reported on 1/28/2021), Disp: 240 mL, Rfl: 0    There is no immunization history on file for this patient.  Allergies   Allergen Reactions     Cats      Vegetable Extract          EXAM:   Constitutional:  Appears well-developed and well-nourished. No distress.   HEENT:   Head: Normocephalic.   Nasal tissue pink and normal appearing.  No rhinorrhea noted.    Eyes: Conjunctivae are non-erythematous Cardiovascular: Normal rate, regular rhythm and normal heart sounds. Exam reveals no gallop and no friction rub.   No murmur heard.  Respiratory: Effort normal and breath sounds normal. No respiratory distress. Wheezing with coughing noted throughout the visit. No rales.   Musculoskeletal: Normal range of motion.   Neuro: Oriented to person, place, and time.  Skin: Skin is warm and dry. No rash noted.   Psychiatric: Normal mood and affect.     Nursing note and vitals reviewed.     ASSESSMENT/PLAN:  Problem List Items Addressed This Visit        Respiratory    Wheezing - Primary     Wheezing, coughing and shortness of breath since March.  Began with presumed Covid.  Had been on Singulair not beneficial.  Had been on Advair and somewhat helpful.  Albuterol helpful.  Prednisone helpful.  Symptoms since that time flared with laughter, exercise, cold air and talking.    -Sending for  complete pulmonary function studies.  -Prednisone 30 mg p.o. twice daily for 7 days.  -Start Advair 230/21 mcg 2 puff inhaled twice daily.  -Albuterol 2-4 puffs inhaled (use a spacer unless using a Proair Respiclick device) every 4 hours as needed for chest tightness, wheezing, shortness of breath and/or coughing.   -Return in 4 weeks.  -Return for allergy testing.          Relevant Medications    fluticasone-salmeterol (ADVAIR HFA) 230-21 MCG/ACT inhaler    predniSONE (DELTASONE) 10 MG tablet    albuterol (PROAIR HFA/PROVENTIL HFA/VENTOLIN HFA) 108 (90 Base) MCG/ACT inhaler    Other Relevant Orders    Pulmonary Function Test    Cough    Relevant Medications    fluticasone-salmeterol (ADVAIR HFA) 230-21 MCG/ACT inhaler    predniSONE (DELTASONE) 10 MG tablet    albuterol (PROAIR HFA/PROVENTIL HFA/VENTOLIN HFA) 108 (90 Base) MCG/ACT inhaler    Other Relevant Orders    Pulmonary Function Test          Chart documentation with Dragon Voice recognition Software. Although reviewed after completion, some words and grammatical errors may remain.    Josh Asencio DO FAAAAI  Medical Director for Allergy/Immunology at [place], [place]        Again, thank you for allowing me to participate in the care of your patient.        Sincerely,        Josh Asencio DO

## 2021-01-28 NOTE — PROGRESS NOTES
Maria De Jesus Garcia is a 30 year old White female with previous medical history significant for cough. Maria De Jesus Garcia is being seen today for evaluation of asthma.     Patient reports that beginning in March she developed a cough.  They thought she had Covid in March but never got tested.  Since that time she has struggled with cough.  Cough is associated with wheezing, shortness of breath.  No history of asthma.  Since this time symptoms flare with cold air, exercise, laughter and talking.  In the past URIs have caused coughing, wheezing and shortness of breath.  Treated with antibiotics and prednisone and thought beneficial.  Was started on Advair 115/21 mcg 2 puff inhaled twice daily.  Thought beneficial but did not resolve cough.  Albuterol is helpful for cough.  Using 1-3 times per week.  Cough drops have been not beneficial.  Symptoms day and night.  Symptoms currently having wheezing, cough and shortness of breath.  No history of pulmonary function studies.  Chest x-rays have been normal.  Numerous ER visits.  No history of allergy testing.  Denies allergy nasal or ocular symptoms.  They have 1 cat in the house.  Cat is been the house for 2 years.    ENVIRONMENTAL HISTORY: [place] The home is heated with a forced air. They do have central air conditioning. The patient's bedroom is furnished with carpeting in bedroom.  Pets inside the house include 1 cat(s). There is no history of cockroach or mice infestation. There is/are 0 smokers in the house.  The house do have a damp basement.       Past Medical History:   Diagnosis Date     Uncomplicated asthma      Family History   Problem Relation Age of Onset     Cancer Maternal Grandfather      History reviewed. No pertinent surgical history.    REVIEW OF SYSTEMS:  General: negative for weight gain. negative for weight loss. negative for changes in sleep.   Ears: negative for fullness. negative for hearing loss. negative for  dizziness.   Nose: negative for snoring.positive  for changes in smell. negative for drainage.   Eyes: negative for eye watering. negative for eye itching. negative for vision changes. negative for eye redness.  Throat: negative for hoarseness. negative for sore throat. negative for trouble swallowing.   Lungs: positive  for shortness of breath.positive  for wheezing. positive  for sputum production.   Cardiovascular: positive  for chest pain. negative for swelling of ankles. negative for fast or irregular heartbeat.   Gastrointestinal: negative for nausea. negative for heartburn. negative for acid reflux.   Musculoskeletal: negative for joint pain. negative for joint stiffness. negative for joint swelling.   Neurologic: negative for seizures. negative for fainting. negative for weakness.   Psychiatric: negative for changes in mood. negative for anxiety.   Endocrine: negative for cold intolerance. negative for heat intolerance. negative for tremors.   Lymphatic: negative for lower extremity swelling. negative for lymph node swelling.   Hematologic: negative for easy bruising. negative for easy bleeding.  Integumentary: negative for rash. negative for scaling. negative for nail changes.       Current Outpatient Medications:      albuterol (PROAIR HFA/PROVENTIL HFA/VENTOLIN HFA) 108 (90 Base) MCG/ACT inhaler, Inhale 2-4 puffs into the lungs every 4 hours as needed for shortness of breath / dyspnea, wheezing or other (cough) Use with spacer device., Disp: 1 Inhaler, Rfl: 4     Aspirin-Acetaminophen-Caffeine (HEADACHE RELIEF PO), Take by mouth as needed, Disp: , Rfl:      DM-Phenylephrine-Acetaminophen (COLD/FLU RELIEF PO), , Disp: , Rfl:      estradiol (ESTRACE) 2 MG tablet, TAKE TWO TABLETS BY MOUTH EVERY MORNING AND TAKE ONE TABLET BY MOUTH EVERY EVENING, Disp: , Rfl:      fluticasone-salmeterol (ADVAIR HFA) 230-21 MCG/ACT inhaler, Inhale 2 puffs into the lungs 2 times daily, Disp: 12 g, Rfl: 3     predniSONE  (DELTASONE) 10 MG tablet, Take 3 tablets (30 mg) by mouth daily for 7 days, Disp: 21 tablet, Rfl: 0     Pseudoephedrine-APAP-DM (DAYQUIL OR), Take by mouth as needed, Disp: , Rfl:      spironolactone (ALDACTONE) 25 MG tablet, Take 50 mg by mouth, Disp: , Rfl:      guaiFENesin-codeine (GUAIFENESIN AC) 100-10 MG/5ML syrup, Take 5 mLs by mouth every 4 hours as needed for congestion (Patient not taking: Reported on 1/28/2021), Disp: 240 mL, Rfl: 0    There is no immunization history on file for this patient.  Allergies   Allergen Reactions     Cats      Vegetable Extract          EXAM:   Constitutional:  Appears well-developed and well-nourished. No distress.   HEENT:   Head: Normocephalic.   Nasal tissue pink and normal appearing.  No rhinorrhea noted.    Eyes: Conjunctivae are non-erythematous Cardiovascular: Normal rate, regular rhythm and normal heart sounds. Exam reveals no gallop and no friction rub.   No murmur heard.  Respiratory: Effort normal and breath sounds normal. No respiratory distress. Wheezing with coughing noted throughout the visit. No rales.   Musculoskeletal: Normal range of motion.   Neuro: Oriented to person, place, and time.  Skin: Skin is warm and dry. No rash noted.   Psychiatric: Normal mood and affect.     Nursing note and vitals reviewed.     ASSESSMENT/PLAN:  Problem List Items Addressed This Visit        Respiratory    Wheezing - Primary     Wheezing, coughing and shortness of breath since March.  Began with presumed Covid.  Had been on Singulair not beneficial.  Had been on Advair and somewhat helpful.  Albuterol helpful.  Prednisone helpful.  Symptoms since that time flared with laughter, exercise, cold air and talking.    -Sending for complete pulmonary function studies.  -Prednisone 30 mg p.o. twice daily for 7 days.  -Start Advair 230/21 mcg 2 puff inhaled twice daily.  -Albuterol 2-4 puffs inhaled (use a spacer unless using a Proair Respiclick device) every 4 hours as needed for  chest tightness, wheezing, shortness of breath and/or coughing.   -Return in 4 weeks.  -Return for allergy testing.          Relevant Medications    fluticasone-salmeterol (ADVAIR HFA) 230-21 MCG/ACT inhaler    predniSONE (DELTASONE) 10 MG tablet    albuterol (PROAIR HFA/PROVENTIL HFA/VENTOLIN HFA) 108 (90 Base) MCG/ACT inhaler    Other Relevant Orders    Pulmonary Function Test    Cough    Relevant Medications    fluticasone-salmeterol (ADVAIR HFA) 230-21 MCG/ACT inhaler    predniSONE (DELTASONE) 10 MG tablet    albuterol (PROAIR HFA/PROVENTIL HFA/VENTOLIN HFA) 108 (90 Base) MCG/ACT inhaler    Other Relevant Orders    Pulmonary Function Test          Chart documentation with Dragon Voice recognition Software. Although reviewed after completion, some words and grammatical errors may remain.    Josh Asencio DO FAAAAI  Medical Director for Allergy/Immunology at [place], [place]

## 2021-01-28 NOTE — LETTER
January 28, 2021      Maria De Jesus Garcia  [address]   [facility] [address]        To Whom It May Concern:    Maria De Jesus A Jose 1/28/2021 for asthma. She has cough that flares with talking. Please minimize talking when her symptoms are flared.         Sincerely,        Josh Asencio, DO

## 2021-01-29 ASSESSMENT — ASTHMA QUESTIONNAIRES: ACT_TOTALSCORE: 17

## 2021-04-12 DIAGNOSIS — R05.9 COUGH: ICD-10-CM

## 2021-04-12 DIAGNOSIS — J45.901 EXACERBATION OF ASTHMA, UNSPECIFIED ASTHMA SEVERITY, UNSPECIFIED WHETHER PERSISTENT: ICD-10-CM

## 2021-04-12 RX ORDER — MONTELUKAST SODIUM 10 MG/1
TABLET ORAL
Qty: 90 TABLET | Refills: 3 | OUTPATIENT
Start: 2021-04-12

## 2021-06-23 DIAGNOSIS — R05.9 COUGH: ICD-10-CM

## 2021-06-23 DIAGNOSIS — R06.2 WHEEZING: ICD-10-CM

## 2021-06-24 RX ORDER — FLUTICASONE PROPIONATE AND SALMETEROL XINAFOATE 230; 21 UG/1; UG/1
AEROSOL, METERED RESPIRATORY (INHALATION)
Refills: 0 | OUTPATIENT
Start: 2021-06-24

## 2021-06-24 NOTE — TELEPHONE ENCOUNTER
Refused Prescriptions:                       Disp   Refills    ADVAIR -21 MCG/ACT inhaler [Pharmac*       0        Sig: TAKE 2 PUFFS BY MOUTH TWICE A DAY  Refused By: TAMMY CHU  Reason for Refusal: Duplicate  Reason for Refusal Comment: see rx sent 6/22/21    Tammy Chu, RN

## 2021-08-09 ENCOUNTER — E-VISIT (OUTPATIENT)
Dept: FAMILY MEDICINE | Facility: CLINIC | Age: 31
End: 2021-08-09
Payer: COMMERCIAL

## 2021-08-09 DIAGNOSIS — Z20.822 SUSPECTED COVID-19 VIRUS INFECTION: Primary | ICD-10-CM

## 2021-08-09 PROCEDURE — 99421 OL DIG E/M SVC 5-10 MIN: CPT | Performed by: PHYSICIAN ASSISTANT

## 2021-08-09 NOTE — LETTER
August 9, 2021      Maria De Jesus Garcia  [address]   [facility] [address]        To Whom It May Concern,      Please excuse patient from work 8/9/2021.          Sincerely,      Gautam Lemos PA-C

## 2021-08-10 NOTE — PATIENT INSTRUCTIONS
Dear Maria De Jesus Garcia,    Your symptoms show that you may have coronavirus (COVID-19). This illness can cause fever, cough and trouble breathing. Many people get a mild case and get better on their own. Some people can get very sick.    Will I be tested for COVID-19?  We would like to test you for Covid-19 virus. I have placed orders for this test.     To schedule: go to your [employer] home page and scroll down to the section that says  You have an appointment that needs to be scheduled  and click the large green button that says  Schedule Now  and follow the steps to find the next available openings.    If you are unable to complete these [employer] scheduling steps, please call [phone] to schedule your testing.     Return to work/school/ guidance:  Please let your workplace manager and staffing office know when your quarantine ends     We can t give you an exact date as it depends on the above. You can calculate this on your own or work with your manager/staffing office to calculate this. (For example if you were exposed on 10/4, you would have to quarantine for 14 full days. That would be through 10/18. You could return on 10/19.)      If you receive a positive COVID-19 test result, follow the guidance of the those who are giving you the results. Usually the return to work is 10 (or in some cases 20 days from symptom onset.) If you work at [facility], you must also be cleared by Employee Occupational Health and Safety to return to work.        If you receive a negative COVID-19 test result and did not have a high risk exposure to someone with a known positive COVID-19 test, you can return to work once you're free of fever for 24 hours without fever-reducing medication and your symptoms are improving or resolved.      If you receive a negative COVID-19 test and If you had a high risk exposure to someone who has tested positive for COVID-19 then you can return to work 14 days after your last contact  with the positive individual    Note: If you have ongoing exposure to the covid positive person, this quarantine period may be more than 14 days. (For example, if you are continued to be exposed to your child who tested positive and cannot isolate from them, then the quarantine of 7-14 days can't start until your child is no longer contagious. This is typically 10 days from onset of the child's symptoms. So the total duration may be 17-24 days in this case.)    Sign up for [employer].   We know it's scary to hear that you might have COVID-19. We want to track your symptoms to make sure you're okay over the next 2 weeks. Please look for an email from [employer]--this is a free, online program that we'll use to keep in touch. To sign up, follow the link in the email you will receive. Learn more at [email or web address]    How can I take care of myself?    Get lots of rest. Drink extra fluids (unless a doctor has told you not to)    Take Tylenol (acetaminophen) or ibuprofen for fever or pain. If you have liver or kidney problems, ask your family doctor if it's okay to take Tylenol o ibuprofen    If you have other health problems (like cancer, heart failure, an organ transplant or severe kidney disease): Call your specialty clinic if you don't feel better in the next 2 days.    Know when to call 911. Emergency warning signs include:  o Trouble breathing or shortness of breath  o Pain or pressure in the chest that doesn't go away  o Feeling confused like you haven't felt before, or not being able to wake up  o Bluish-colored lips or face    Where can I get more information?  [employer] [facility] - About COVID-19:   [email or web address]    CDC - What to Do If You're Sick:   www.cdc.gov/coronavirus/2019-ncov/about/steps-when-sick.html

## 2021-10-10 ENCOUNTER — HEALTH MAINTENANCE LETTER (OUTPATIENT)
Age: 31
End: 2021-10-10

## 2022-01-29 ENCOUNTER — HEALTH MAINTENANCE LETTER (OUTPATIENT)
Age: 32
End: 2022-01-29

## 2022-09-18 ENCOUNTER — HEALTH MAINTENANCE LETTER (OUTPATIENT)
Age: 32
End: 2022-09-18

## 2023-01-28 ENCOUNTER — HEALTH MAINTENANCE LETTER (OUTPATIENT)
Age: 33
End: 2023-01-28

## 2023-03-09 NOTE — PATIENT INSTRUCTIONS
Allergy Staff Appt Hours Shot Hours Locations    Physician     Josh Asencio DO       Support Staff     FELICIA Noel CMA  Tuesday:        [place] 7-5 Wednesday:        [place]: 7-5 Thursday:                    [place] 7-6     Friday:  [place]  7-2   [place]        Thursday: 8-5:20        Friday: 7-12     [place]        Tuesday: 7- 3:20 Wednesday: 7-4:20     [place] Monday: 7-2:20 Tuesday: 9-5:20         [facility]  [address] [place] [address]  Appt Line: [phone]  Allergy RN:  [phone]    [facility]  [address] [place] [address]  Appt Line: [phone]  Allergy RN:  [phone]       Important Scheduling Information  Aspirin Desensitization: Appt will last 2 clinic days. Please call the Allergy RN line for your clinic to schedule. Discontinue antihistamines 7 days prior to the appointment.     Food Challenges: Appt will last 3-4 hours. Please call the Allergy RN line for your clinic to schedule. Discontinue antihistamines 7 days prior to the appointment.     Penicillin Testing: Appt will last 2-3 hours. Please call the Allergy RN line for your clinic to schedule. Discontinue antihistamines 7 days prior to the appointment.     Skin Testing: Appt will about 40 minutes. Call the appointment line for your clinic to schedule. Discontinue antihistamines 7 days prior to the appointment.     Venom Testing: Appt will last 2-3 hours. Please call the Allergy RN line for your clinic to schedule. Discontinue antihistamines 7 days prior to the appointment.     Thank you for trusting us with your Allergy, Asthma, and Immunology care. Please feel free to contact us with any questions or concerns you may have.      - Prednisone 30mg twice daily for 7 days.   - Advair 230/21mcg 2 puffs inhaled twice daily. Use with spacer.   - Albuterol 2-4 puffs inhaled (use a spacer unless using a Proair Respiclick device) every 4 hours as needed for  chest tightness, wheezing, shortness of breath and/or coughing.   - Stop Singulair.   - Ordered complete pulmonary function studies.   - Return in 4 weeks. Will do allergy testing at that time.    decreased strength

## 2024-02-25 ENCOUNTER — HEALTH MAINTENANCE LETTER (OUTPATIENT)
Age: 34
End: 2024-02-25